# Patient Record
Sex: MALE | HISPANIC OR LATINO | Employment: FULL TIME | URBAN - METROPOLITAN AREA
[De-identification: names, ages, dates, MRNs, and addresses within clinical notes are randomized per-mention and may not be internally consistent; named-entity substitution may affect disease eponyms.]

---

## 2019-01-14 ENCOUNTER — HOSPITAL ENCOUNTER (OUTPATIENT)
Dept: URGENT CARE | Facility: CLINIC | Age: 39
Discharge: HOME | End: 2019-01-14

## 2019-04-15 ENCOUNTER — HOSPITAL ENCOUNTER (OUTPATIENT)
Dept: URGENT CARE | Facility: CLINIC | Age: 39
Discharge: HOME | End: 2019-04-15
Attending: EMERGENCY MEDICINE

## 2020-02-26 LAB
APTT BLD: 25.5 S (ref 22.2–34.2)
BASOPHILS # BLD AUTO: 0.07 10*3/UL (ref 0–0.2)
BASOPHILS NFR BLD AUTO: 1.3 % (ref 0–3)
CONV ABS IMM GRAN: 0.01 10*3/UL (ref 0–0.2)
CONV IMMATURE GRAN: 0.2 % (ref 0–1.8)
DEPRECATED RDW RBC AUTO: 39.8 FL (ref 35.1–43.9)
EOSINOPHIL # BLD AUTO: 0.12 10*3/UL (ref 0–0.7)
EOSINOPHIL # BLD AUTO: 2.2 % (ref 0–7)
ERYTHROCYTE [DISTWIDTH] IN BLOOD BY AUTOMATED COUNT: 12.8 % (ref 11.6–14.4)
HCT VFR BLD AUTO: 42.1 % (ref 42–52)
HGB BLD-MCNC: 14 G/DL (ref 14–18)
INR PPP: 0.93 (ref 2–3)
LYMPHOCYTES # BLD AUTO: 2.02 10*3/UL (ref 1–5)
LYMPHOCYTES NFR BLD AUTO: 37.6 % (ref 20–45)
MCH RBC QN AUTO: 28.6 PG (ref 27–31)
MCHC RBC AUTO-ENTMCNC: 33.3 G/DL (ref 33–37)
MCV RBC AUTO: 85.9 FL (ref 80–96)
MONOCYTES # BLD AUTO: 0.36 10*3/UL (ref 0.2–1.2)
MONOCYTES NFR BLD AUTO: 6.7 % (ref 3–10)
NEUTROPHILS # BLD AUTO: 2.79 10*3/UL (ref 2–8)
NEUTROPHILS NFR BLD AUTO: 52 % (ref 30–85)
NRBC CBCN: 0 % (ref 0–0.7)
PLATELET # BLD AUTO: 268 10*3/UL (ref 130–400)
PMV BLD AUTO: 11.4 FL (ref 9.4–12.4)
PROTHROMBIN TIME: 10.1 S (ref 9.4–12)
RBC # BLD AUTO: 4.9 10*6/UL (ref 4.7–6.1)
WBC # BLD AUTO: 5.37 10*3/UL (ref 4.8–10.8)

## 2020-02-28 ENCOUNTER — HOSPITAL ENCOUNTER (OUTPATIENT)
Dept: PERIOP | Facility: HOSPITAL | Age: 40
Setting detail: HOSPITAL OUTPATIENT SURGERY
Discharge: HOME | End: 2020-02-28
Attending: OTOLARYNGOLOGY

## 2022-01-26 ENCOUNTER — APPOINTMENT (OUTPATIENT)
Dept: GENERAL RADIOLOGY | Facility: HOSPITAL | Age: 42
End: 2022-01-26

## 2022-01-26 ENCOUNTER — HOSPITAL ENCOUNTER (EMERGENCY)
Facility: HOSPITAL | Age: 42
Discharge: HOME | End: 2022-01-26
Attending: EMERGENCY MEDICINE | Admitting: EMERGENCY MEDICINE

## 2022-01-26 VITALS
SYSTOLIC BLOOD PRESSURE: 149 MMHG | BODY MASS INDEX: 31.21 KG/M2 | OXYGEN SATURATION: 97 % | TEMPERATURE: 98.3 F | HEART RATE: 61 BPM | WEIGHT: 194.22 LBS | RESPIRATION RATE: 16 BRPM | HEIGHT: 66 IN | DIASTOLIC BLOOD PRESSURE: 103 MMHG

## 2022-01-26 DIAGNOSIS — R07.89 ATYPICAL CHEST PAIN: Primary | ICD-10-CM

## 2022-01-26 DIAGNOSIS — R09.1 PLEURISY: ICD-10-CM

## 2022-01-26 DIAGNOSIS — I10 UNCONTROLLED HYPERTENSION: ICD-10-CM

## 2022-01-26 LAB
ALBUMIN SERPL-MCNC: 5.2 G/DL (ref 3.5–5.2)
ALBUMIN/GLOB SERPL: 1.9 G/DL
ALP SERPL-CCNC: 43 U/L (ref 39–117)
ALT SERPL W P-5'-P-CCNC: 39 U/L (ref 1–41)
ANION GAP SERPL CALCULATED.3IONS-SCNC: 12.7 MMOL/L (ref 5–15)
AST SERPL-CCNC: 33 U/L (ref 1–40)
BASOPHILS # BLD AUTO: 0.04 10*3/MM3 (ref 0–0.2)
BASOPHILS NFR BLD AUTO: 0.6 % (ref 0–1.5)
BILIRUB SERPL-MCNC: 0.4 MG/DL (ref 0–1.2)
BUN SERPL-MCNC: 9 MG/DL (ref 6–20)
BUN/CREAT SERPL: 13.8 (ref 7–25)
CALCIUM SPEC-SCNC: 10.1 MG/DL (ref 8.6–10.5)
CHLORIDE SERPL-SCNC: 100 MMOL/L (ref 98–107)
CK MB SERPL-CCNC: 4.44 NG/ML
CK SERPL-CCNC: 348 U/L (ref 20–200)
CO2 SERPL-SCNC: 27.3 MMOL/L (ref 22–29)
CREAT SERPL-MCNC: 0.65 MG/DL (ref 0.76–1.27)
DEPRECATED RDW RBC AUTO: 36.3 FL (ref 37–54)
EOSINOPHIL # BLD AUTO: 0.09 10*3/MM3 (ref 0–0.4)
EOSINOPHIL NFR BLD AUTO: 1.4 % (ref 0.3–6.2)
ERYTHROCYTE [DISTWIDTH] IN BLOOD BY AUTOMATED COUNT: 12 % (ref 12.3–15.4)
GFR SERPL CREATININE-BSD FRML MDRD: 135 ML/MIN/1.73
GFR SERPL CREATININE-BSD FRML MDRD: >150 ML/MIN/1.73
GLOBULIN UR ELPH-MCNC: 2.8 GM/DL
GLUCOSE SERPL-MCNC: 103 MG/DL (ref 65–99)
HCT VFR BLD AUTO: 40.4 % (ref 37.5–51)
HGB BLD-MCNC: 14.4 G/DL (ref 13–17.7)
HOLD SPECIMEN: NORMAL
IMM GRANULOCYTES # BLD AUTO: 0.01 10*3/MM3 (ref 0–0.05)
IMM GRANULOCYTES NFR BLD AUTO: 0.2 % (ref 0–0.5)
LIPASE SERPL-CCNC: 45 U/L (ref 13–60)
LYMPHOCYTES # BLD AUTO: 2.35 10*3/MM3 (ref 0.7–3.1)
LYMPHOCYTES NFR BLD AUTO: 35.6 % (ref 19.6–45.3)
MAGNESIUM SERPL-MCNC: 2.1 MG/DL (ref 1.6–2.6)
MCH RBC QN AUTO: 29.3 PG (ref 26.6–33)
MCHC RBC AUTO-ENTMCNC: 35.6 G/DL (ref 31.5–35.7)
MCV RBC AUTO: 82.3 FL (ref 79–97)
MONOCYTES # BLD AUTO: 0.45 10*3/MM3 (ref 0.1–0.9)
MONOCYTES NFR BLD AUTO: 6.8 % (ref 5–12)
NEUTROPHILS NFR BLD AUTO: 3.66 10*3/MM3 (ref 1.7–7)
NEUTROPHILS NFR BLD AUTO: 55.4 % (ref 42.7–76)
NRBC BLD AUTO-RTO: 0 /100 WBC (ref 0–0.2)
NT-PROBNP SERPL-MCNC: 16.8 PG/ML (ref 0–450)
PLATELET # BLD AUTO: 296 10*3/MM3 (ref 140–450)
PMV BLD AUTO: 10.4 FL (ref 6–12)
POTASSIUM SERPL-SCNC: 3.7 MMOL/L (ref 3.5–5.2)
PROT SERPL-MCNC: 8 G/DL (ref 6–8.5)
RBC # BLD AUTO: 4.91 10*6/MM3 (ref 4.14–5.8)
SODIUM SERPL-SCNC: 140 MMOL/L (ref 136–145)
TROPONIN I SERPL-MCNC: 0 NG/ML (ref 0–0.6)
TROPONIN I SERPL-MCNC: 0 NG/ML (ref 0–0.6)
WBC NRBC COR # BLD: 6.6 10*3/MM3 (ref 3.4–10.8)
WHOLE BLOOD HOLD SPECIMEN: NORMAL
WHOLE BLOOD HOLD SPECIMEN: NORMAL

## 2022-01-26 PROCEDURE — 99283 EMERGENCY DEPT VISIT LOW MDM: CPT

## 2022-01-26 PROCEDURE — 96372 THER/PROPH/DIAG INJ SC/IM: CPT

## 2022-01-26 PROCEDURE — 83690 ASSAY OF LIPASE: CPT | Performed by: EMERGENCY MEDICINE

## 2022-01-26 PROCEDURE — 25010000002 KETOROLAC TROMETHAMINE PER 15 MG: Performed by: EMERGENCY MEDICINE

## 2022-01-26 PROCEDURE — 82550 ASSAY OF CK (CPK): CPT | Performed by: EMERGENCY MEDICINE

## 2022-01-26 PROCEDURE — 84484 ASSAY OF TROPONIN QUANT: CPT

## 2022-01-26 PROCEDURE — 82553 CREATINE MB FRACTION: CPT | Performed by: EMERGENCY MEDICINE

## 2022-01-26 PROCEDURE — 71045 X-RAY EXAM CHEST 1 VIEW: CPT

## 2022-01-26 PROCEDURE — 93010 ELECTROCARDIOGRAM REPORT: CPT | Performed by: INTERNAL MEDICINE

## 2022-01-26 PROCEDURE — 83880 ASSAY OF NATRIURETIC PEPTIDE: CPT | Performed by: EMERGENCY MEDICINE

## 2022-01-26 PROCEDURE — 36415 COLL VENOUS BLD VENIPUNCTURE: CPT | Performed by: EMERGENCY MEDICINE

## 2022-01-26 PROCEDURE — 85025 COMPLETE CBC W/AUTO DIFF WBC: CPT | Performed by: EMERGENCY MEDICINE

## 2022-01-26 PROCEDURE — 80053 COMPREHEN METABOLIC PANEL: CPT | Performed by: EMERGENCY MEDICINE

## 2022-01-26 PROCEDURE — 93005 ELECTROCARDIOGRAM TRACING: CPT | Performed by: EMERGENCY MEDICINE

## 2022-01-26 PROCEDURE — 83735 ASSAY OF MAGNESIUM: CPT | Performed by: EMERGENCY MEDICINE

## 2022-01-26 RX ORDER — HYDROCHLOROTHIAZIDE 25 MG/1
25 TABLET ORAL DAILY
COMMUNITY

## 2022-01-26 RX ORDER — KETOROLAC TROMETHAMINE 30 MG/ML
60 INJECTION, SOLUTION INTRAMUSCULAR; INTRAVENOUS ONCE
Status: COMPLETED | OUTPATIENT
Start: 2022-01-26 | End: 2022-01-26

## 2022-01-26 RX ORDER — LISINOPRIL 5 MG/1
5 TABLET ORAL DAILY
COMMUNITY

## 2022-01-26 RX ORDER — IBUPROFEN 600 MG/1
600 TABLET ORAL EVERY 8 HOURS PRN
Qty: 30 TABLET | Refills: 0 | Status: SHIPPED | OUTPATIENT
Start: 2022-01-26 | End: 2022-02-05

## 2022-01-26 RX ORDER — ASPIRIN 81 MG/1
324 TABLET, CHEWABLE ORAL ONCE
Status: COMPLETED | OUTPATIENT
Start: 2022-01-26 | End: 2022-01-26

## 2022-01-26 RX ORDER — SODIUM CHLORIDE 0.9 % (FLUSH) 0.9 %
10 SYRINGE (ML) INJECTION AS NEEDED
Status: DISCONTINUED | OUTPATIENT
Start: 2022-01-26 | End: 2022-01-26 | Stop reason: HOSPADM

## 2022-01-26 RX ADMIN — KETOROLAC TROMETHAMINE 60 MG: 60 INJECTION, SOLUTION INTRAMUSCULAR at 04:52

## 2022-01-26 RX ADMIN — ASPIRIN 81 MG CHEWABLE TABLET 324 MG: 81 TABLET CHEWABLE at 01:36

## 2022-01-26 NOTE — DISCHARGE INSTRUCTIONS
No strenuous activity until released by your cardiologist.  Please start on a daily baby aspirin.  Please return to the emergency room for worsening chest pain, radiating chest pain, shortness of breath, near passing out, passing out, extreme fatigue, extreme sweating, nausea or vomiting or new or worrisome symptoms    Please check your blood pressure twice a day, record and discuss those readings with your cardiologist, Dr. Natanael Nunez

## 2022-01-26 NOTE — ED PROVIDER NOTES
Time: 4:20 AM EST  Arrived by: private car  Chief Complaint: Chest pain  History provided by: Patient  History is limited by: The patient is [place]uran-speaking only.  The patient's daughter is at bedside and speaks English well.  I offered a formal .  The patient is requesting that her daughter translate.  The daughter feels comfortable for translation.      History of Present Illness:  Patient is a 41 y.o. year old male that presents to the emergency department with chest pain.  The patient has had chest pain since Thursday.  This is approximately 6 days.  The patient's chest pain is intermittent.  The patient locates the pain on the left side of the chest and radiates to the back.  There is no radiation to the jaw neck or arms.  The patient states that it is sharp in nature and worsens with deep breath.  The patient has had no nausea or diaphoresis.  Although the patient states that it hurts to breathe he denies any shortness of breath.  The patient has had no cough.  The patient has had no trauma.  The patient's had no fever, chills or myalgias.  The patient denies any abdominal pain.  The patient has had no diarrhea or hematochezia.  The patient has no unilateral calf pain or leg swelling.  The patient has hypertension.  The patient denies a history of cholesterol, diabetes or smoking.  Although he smoked over 10 years ago.  The patient does have a family history of coronary disease.  He believes it is his grandfather.  The patient has no previous history of DVT or pulmonary embolism.  The patient has had no recent travel, immobilization or surgery.  The patient does not have a history of cancer.      Similar Symptoms Previously: Yes  Recently seen: No      Patient Care Team  Primary Care Provider: Provider, No Known, the patient does see Dr. Natanael Nunez, cardiologist for hypertension    Past Medical History:     No Known Allergies  Past Medical History:   Diagnosis Date   • Hypertension      Past Surgical  "History:   Procedure Laterality Date   • THROAT SURGERY       History reviewed. No pertinent family history.    Home Medications:  Prior to Admission medications    Medication Sig Start Date End Date Taking? Authorizing Provider   hydroCHLOROthiazide (HYDRODIURIL) 25 MG tablet Take 25 mg by mouth Daily.    Provider, MD Jennifer   lisinopril (PRINIVIL,ZESTRIL) 5 MG tablet Take 5 mg by mouth Daily.    Provider, Jennifer, MD        Social History:   Social History     Tobacco Use   • Smoking status: Never Smoker   • Smokeless tobacco: Never Used   Vaping Use   • Vaping Use: Never used   Substance Use Topics   • Alcohol use: Never   • Drug use: Never          Record Review:  I have reviewed the patient's records in [facility].     Review of Systems:  Review of Systems   Constitutional: Negative for chills, diaphoresis, fatigue and fever.   HENT: Negative for congestion, postnasal drip, rhinorrhea and sore throat.    Eyes: Negative for photophobia.   Respiratory: Negative for apnea, cough, chest tightness, shortness of breath and wheezing.    Cardiovascular: Positive for chest pain. Negative for palpitations and leg swelling.   Gastrointestinal: Negative for abdominal pain, diarrhea, nausea and vomiting.   Genitourinary: Negative for difficulty urinating, dysuria, frequency, hematuria and urgency.   Musculoskeletal: Negative for back pain, gait problem and neck pain.   Skin: Negative for pallor.   Neurological: Negative for dizziness, syncope, weakness, light-headedness, numbness and headaches.   Hematological: Negative.    Psychiatric/Behavioral: Negative.            Physical Exam:  BP (!) 172/101 (BP Location: Left arm, Patient Position: Sitting)   Pulse 62   Temp 98.3 °F (36.8 °C) (Oral)   Resp 16   Ht 167.6 cm (66\")   Wt 88.1 kg (194 lb 3.6 oz)   SpO2 96%   BMI 31.35 kg/m²     Physical Exam  Vitals and nursing note reviewed.   Constitutional:       General: He is not in acute distress.     Appearance: He is " well-developed. He is not ill-appearing, toxic-appearing or diaphoretic.   HENT:      Head: Normocephalic and atraumatic.      Mouth/Throat:      Mouth: Mucous membranes are moist.   Eyes:      Extraocular Movements: Extraocular movements intact.   Neck:      Thyroid: No thyromegaly.   Cardiovascular:      Rate and Rhythm: Normal rate and regular rhythm.      Pulses: Normal pulses.           Carotid pulses are 2+ on the right side and 2+ on the left side.       Radial pulses are 2+ on the right side and 2+ on the left side.        Dorsalis pedis pulses are 2+ on the right side and 2+ on the left side.        Posterior tibial pulses are 2+ on the right side and 2+ on the left side.      Heart sounds: Normal heart sounds. No murmur heard.      Pulmonary:      Effort: Pulmonary effort is normal. No tachypnea, accessory muscle usage, respiratory distress or retractions.      Breath sounds: Normal breath sounds. No decreased breath sounds, wheezing, rhonchi or rales.      Comments: The patient's chest pain worsens with deep breath.  The patient's pain is not worse with supine position.  The patient's pain is not worsened with palpation of the anterior chest wall  Chest:      Chest wall: No mass, tenderness, crepitus or edema.   Abdominal:      General: Abdomen is flat. There is no distension or abdominal bruit.      Palpations: Abdomen is soft. There is no mass.      Tenderness: There is no abdominal tenderness. There is no guarding or rebound.      Comments: No rigidity   Musculoskeletal:         General: Normal range of motion.      Cervical back: Normal range of motion and neck supple.      Right lower leg: No tenderness. No edema.      Left lower leg: No tenderness. No edema.   Skin:     General: Skin is warm and dry.      Capillary Refill: Capillary refill takes less than 2 seconds.      Coloration: Skin is not cyanotic or pale.      Findings: No ecchymosis.   Neurological:      General: No focal deficit present.       Mental Status: He is alert. Mental status is at baseline.      Motor: No weakness.   Psychiatric:         Mood and Affect: Mood normal.         Behavior: Behavior normal.                Medications in the Emergency Department:  Medications   sodium chloride 0.9 % flush 10 mL (has no administration in time range)   aspirin chewable tablet 324 mg (324 mg Oral Given 1/26/22 0136)   ketorolac (TORADOL) injection 60 mg (60 mg Intramuscular Given 1/26/22 0452)        Labs  Lab Results (last 24 hours)     Procedure Component Value Units Date/Time    POC Troponin I with Hold Tube [[id number]] Collected: 01/26/22 0108    Specimen: Blood Updated: 01/26/22 0231    Narrative:      The following orders were created for panel order POC Troponin I with Hold Tube.  Procedure                               Abnormality         Status                     ---------                               -----------         ------                     POC Troponin I[[id number]]                                                              HOLD Troponin-I Tube[[id number]]                             Final result                 Please view results for these tests on the individual orders.    CBC & Differential [[id number]]  (Abnormal) Collected: 01/26/22 0108    Specimen: Blood Updated: 01/26/22 0133    Narrative:      The following orders were created for panel order CBC & Differential.  Procedure                               Abnormality         Status                     ---------                               -----------         ------                     CBC Auto Differential[[id number]]        Abnormal            Final result                 Please view results for these tests on the individual orders.    Comprehensive Metabolic Panel [[id number]]  (Abnormal) Collected: 01/26/22 0108    Specimen: Blood Updated: 01/26/22 0145     Glucose 103 mg/dL      BUN 9 mg/dL      Creatinine 0.65 mg/dL      Sodium 140 mmol/L      Potassium 3.7 mmol/L       Chloride 100 mmol/L      CO2 27.3 mmol/L      Calcium 10.1 mg/dL      Total Protein 8.0 g/dL      Albumin 5.20 g/dL      ALT (SGPT) 39 U/L      AST (SGOT) 33 U/L      Alkaline Phosphatase 43 U/L      Total Bilirubin 0.4 mg/dL      eGFR Non African Amer 135 mL/min/1.73      eGFR  African Amer >150 mL/min/1.73      Globulin 2.8 gm/dL      A/G Ratio 1.9 g/dL      BUN/Creatinine Ratio 13.8     Anion Gap 12.7 mmol/L     Narrative:      GFR Normal >60  Chronic Kidney Disease <60  Kidney Failure <15      Lipase [[id number]]  (Normal) Collected: 01/26/22 0108    Specimen: Blood Updated: 01/26/22 0145     Lipase 45 U/L     BNP [[id number]]  (Normal) Collected: 01/26/22 0108    Specimen: Blood Updated: 01/26/22 0143     proBNP 16.8 pg/mL     Narrative:      Among patients with dyspnea, NT-proBNP is highly sensitive for the detection of acute congestive heart failure. In addition NT-proBNP of <300 pg/ml effectively rules out acute congestive heart failure with 99% negative predictive value.    Results may be falsely decreased if patient taking Biotin.      Magnesium [[id number]]  (Normal) Collected: 01/26/22 0108    Specimen: Blood Updated: 01/26/22 0145     Magnesium 2.1 mg/dL     CK Total & CKMB [[id number]]  (Abnormal) Collected: 01/26/22 0108    Specimen: Blood Updated: 01/26/22 0145     CKMB 4.44 ng/mL      Creatine Kinase 348 U/L     Narrative:      CKMB results may be falsely decreased if patient taking Biotin.    CBC Auto Differential [[id number]]  (Abnormal) Collected: 01/26/22 0108    Specimen: Blood Updated: 01/26/22 0133     WBC 6.60 10*3/mm3      RBC 4.91 10*6/mm3      Hemoglobin 14.4 g/dL      Hematocrit 40.4 %      MCV 82.3 fL      MCH 29.3 pg      MCHC 35.6 g/dL      RDW 12.0 %      RDW-SD 36.3 fl      MPV 10.4 fL      Platelets 296 10*3/mm3      Neutrophil % 55.4 %      Lymphocyte % 35.6 %      Monocyte % 6.8 %      Eosinophil % 1.4 %      Basophil % 0.6 %      Immature Grans % 0.2 %      Neutrophils, Absolute 3.66  10*3/mm3      Lymphocytes, Absolute 2.35 10*3/mm3      Monocytes, Absolute 0.45 10*3/mm3      Eosinophils, Absolute 0.09 10*3/mm3      Basophils, Absolute 0.04 10*3/mm3      Immature Grans, Absolute 0.01 10*3/mm3      nRBC 0.0 /100 WBC     POC Troponin I [[id number]]  (Normal) Collected: 01/26/22 0117    Specimen: Blood Updated: 01/26/22 0129     Troponin I 0.00 ng/mL      Comment: Serial Number: [id number]:  [id number]       POC Troponin I [[id number]]  (Normal) Collected: 01/26/22 0341    Specimen: Blood Updated: 01/26/22 0353     Troponin I 0.00 ng/mL      Comment: Serial Number: [id number]:  [id number]              Imaging:  XR Chest 1 View   Final Result     No acute infiltrate is appreciated.                       ANDERSON NEWTON JR, MD          Electronically Signed and Approved By: ANDERSON NEWTON JR, MD on 1/26/2022 at 2:30                               Procedures:  Procedures    Progress  ED Course as of 01/26/22 0517   Wed Jan 26, 2022   0115 EKG:    Rhythm: Normal sinus rhythm  Rate: 72  Intervals: Normal GA and QT interval  T-wave: T wave inversion III and nonspecific T wave flattening in V6, II, aVF  ST Segment: Nonspecific ST segments V1, V2, V3, no reciprocal changes, no obvious pathological ST elevation or ST depression    EKG Comparison: The EKG is unchanged in QRS and ST morphology from the EKG performed February 26, 2020    Interpreted by me   [SD]   0419 EKG:    Rhythm: Normal sinus rhythm  Rate: 63  Intervals: Normal GA and QT interval  T-wave: T wave inversion in III, nonspecific T wave flattening in V6, remainder of 2, aVF  ST Segment: Nonspecific ST segments in V1, V2, V3, no reciprocal changes, no obvious pathological ST elevation or ST depression    EKG Comparison: EKG is unchanged from the EKG performed previously in the department    Interpreted by me   [SD]      ED Course User Index  [SD] Moy Millan DO                            Medical Decision Making:  MDM  Number of Diagnoses or  Management Options  Diagnosis management comments:     PERC Rule for Pulmonary Embolism - MDCalc  Calculated on Jan 26 2022 4:25 AM  0 criteria -> No need for further workup, as <2% chance of PE. If no criteria are positive and clinician's pre-test probability is <15%, PERC Rule criteria are satisfied.    The patient's PERC score was negative.  I have discussed with the patient that they have a very low risk for pulmonary embolism.  I have discussed possibly performing a CAT scan of the chest with IV contrast to rule out pulmonary embolism.  However, due to the fact that the patient is very low risk for pulmonary embolism, they would prefer not to have a CAT scan of the chest at this time due to radiation exposure.    HEART Score for Major Cardiac Events - MDCalc  Calculated on Jan 26 2022 4:26 AM  2 points -> Low Score (0-3 points) Risk of MACE of 0.9-1.7%.    The patient was given Toradol and reevaluated.  The patient states his pain is much better after the Toradol.    The patient had 2 troponins that were within normal limits.  The patient had 2 EKGs that demonstrated no evidence of ST segment elevation MI or other injury pattern    The patient appears well, in no distress and nontoxic.  The patient is resting comfortably.  The patient has a low heart score.  I have discussed the significance of the heart score with them.  The patient understands that they have a low risk for cardiovascular event over the next 6 weeks.  Understanding the risks, they feel comfortable to be discharged home and to follow-up with the cardiologist on an outpatient basis for stress test.   In the interim, the patient does understand should they develop worsening chest pain, near syncope, syncope, extreme fatigue, worsening shortness of breath or diaphoresis to return back to emergency room immediately.         Amount and/or Complexity of Data Reviewed  Clinical lab tests: reviewed  Tests in the radiology section of CPT®:  reviewed  Tests in the medicine section of CPT®: reviewed               Final diagnoses:   Atypical chest pain   Pleurisy        Disposition:  ED Disposition     ED Disposition Condition Comment    Discharge Stable           This medical record created using voice recognition software and may contain unintended errors.    DO Monty Escobedo Scott, DO  01/30/22 8816

## 2022-01-29 LAB
QT INTERVAL: 382 MS
QT INTERVAL: 408 MS

## 2022-03-29 ENCOUNTER — TRANSCRIBE ORDERS (OUTPATIENT)
Dept: GENERAL RADIOLOGY | Facility: HOSPITAL | Age: 42
End: 2022-03-29

## 2022-03-29 ENCOUNTER — HOSPITAL ENCOUNTER (OUTPATIENT)
Dept: GENERAL RADIOLOGY | Facility: HOSPITAL | Age: 42
Discharge: HOME | End: 2022-03-29
Admitting: CHIROPRACTOR

## 2022-03-29 DIAGNOSIS — R60.9 SWELLING: Primary | ICD-10-CM

## 2022-03-29 DIAGNOSIS — R60.9 SWELLING: ICD-10-CM

## 2022-03-29 PROCEDURE — 73630 X-RAY EXAM OF FOOT: CPT

## 2022-12-06 ENCOUNTER — APPOINTMENT (OUTPATIENT)
Dept: CT IMAGING | Facility: HOSPITAL | Age: 42
End: 2022-12-06

## 2022-12-06 ENCOUNTER — HOSPITAL ENCOUNTER (EMERGENCY)
Facility: HOSPITAL | Age: 42
Discharge: HOME | End: 2022-12-06
Attending: EMERGENCY MEDICINE | Admitting: EMERGENCY MEDICINE

## 2022-12-06 VITALS
OXYGEN SATURATION: 97 % | TEMPERATURE: 98 F | SYSTOLIC BLOOD PRESSURE: 170 MMHG | BODY MASS INDEX: 32.35 KG/M2 | HEART RATE: 73 BPM | DIASTOLIC BLOOD PRESSURE: 112 MMHG | WEIGHT: 200.4 LBS | RESPIRATION RATE: 18 BRPM

## 2022-12-06 DIAGNOSIS — S02.2XXA CLOSED FRACTURE OF NASAL BONE, INITIAL ENCOUNTER: Primary | ICD-10-CM

## 2022-12-06 LAB
ALBUMIN SERPL-MCNC: 5.2 G/DL (ref 3.5–5.2)
ALBUMIN/GLOB SERPL: 1.7 G/DL
ALP SERPL-CCNC: 62 U/L (ref 39–117)
ALT SERPL W P-5'-P-CCNC: 33 U/L (ref 1–41)
ANION GAP SERPL CALCULATED.3IONS-SCNC: 15.2 MMOL/L (ref 5–15)
AST SERPL-CCNC: 30 U/L (ref 1–40)
BASOPHILS # BLD AUTO: 0.07 10*3/MM3 (ref 0–0.2)
BASOPHILS NFR BLD AUTO: 1.1 % (ref 0–1.5)
BILIRUB SERPL-MCNC: 0.2 MG/DL (ref 0–1.2)
BUN SERPL-MCNC: 8 MG/DL (ref 6–20)
BUN/CREAT SERPL: 13.1 (ref 7–25)
CALCIUM SPEC-SCNC: 9.5 MG/DL (ref 8.6–10.5)
CHLORIDE SERPL-SCNC: 97 MMOL/L (ref 98–107)
CO2 SERPL-SCNC: 26.8 MMOL/L (ref 22–29)
CREAT SERPL-MCNC: 0.61 MG/DL (ref 0.76–1.27)
DEPRECATED RDW RBC AUTO: 37 FL (ref 37–54)
EGFRCR SERPLBLD CKD-EPI 2021: 123.8 ML/MIN/1.73
EOSINOPHIL # BLD AUTO: 0.16 10*3/MM3 (ref 0–0.4)
EOSINOPHIL NFR BLD AUTO: 2.5 % (ref 0.3–6.2)
ERYTHROCYTE [DISTWIDTH] IN BLOOD BY AUTOMATED COUNT: 12.2 % (ref 12.3–15.4)
ETHANOL BLD-MCNC: 259 MG/DL (ref 0–10)
ETHANOL UR QL: 0.26 %
GLOBULIN UR ELPH-MCNC: 3 GM/DL
GLUCOSE SERPL-MCNC: 108 MG/DL (ref 65–99)
HCT VFR BLD AUTO: 41.7 % (ref 37.5–51)
HGB BLD-MCNC: 14.8 G/DL (ref 13–17.7)
IMM GRANULOCYTES # BLD AUTO: 0.01 10*3/MM3 (ref 0–0.05)
IMM GRANULOCYTES NFR BLD AUTO: 0.2 % (ref 0–0.5)
LYMPHOCYTES # BLD AUTO: 3.05 10*3/MM3 (ref 0.7–3.1)
LYMPHOCYTES NFR BLD AUTO: 48.4 % (ref 19.6–45.3)
MCH RBC QN AUTO: 29.5 PG (ref 26.6–33)
MCHC RBC AUTO-ENTMCNC: 35.5 G/DL (ref 31.5–35.7)
MCV RBC AUTO: 83.1 FL (ref 79–97)
MONOCYTES # BLD AUTO: 0.43 10*3/MM3 (ref 0.1–0.9)
MONOCYTES NFR BLD AUTO: 6.8 % (ref 5–12)
NEUTROPHILS NFR BLD AUTO: 2.58 10*3/MM3 (ref 1.7–7)
NEUTROPHILS NFR BLD AUTO: 41 % (ref 42.7–76)
NRBC BLD AUTO-RTO: 0 /100 WBC (ref 0–0.2)
PLATELET # BLD AUTO: 247 10*3/MM3 (ref 140–450)
PMV BLD AUTO: 10.5 FL (ref 6–12)
POTASSIUM SERPL-SCNC: 3.5 MMOL/L (ref 3.5–5.2)
PROT SERPL-MCNC: 8.2 G/DL (ref 6–8.5)
RBC # BLD AUTO: 5.02 10*6/MM3 (ref 4.14–5.8)
SODIUM SERPL-SCNC: 139 MMOL/L (ref 136–145)
WBC NRBC COR # BLD: 6.3 10*3/MM3 (ref 3.4–10.8)

## 2022-12-06 PROCEDURE — 25010000002 METOCLOPRAMIDE PER 10 MG: Performed by: EMERGENCY MEDICINE

## 2022-12-06 PROCEDURE — 96374 THER/PROPH/DIAG INJ IV PUSH: CPT

## 2022-12-06 PROCEDURE — 85025 COMPLETE CBC W/AUTO DIFF WBC: CPT | Performed by: EMERGENCY MEDICINE

## 2022-12-06 PROCEDURE — 82077 ASSAY SPEC XCP UR&BREATH IA: CPT | Performed by: PHYSICIAN ASSISTANT

## 2022-12-06 PROCEDURE — 96375 TX/PRO/DX INJ NEW DRUG ADDON: CPT

## 2022-12-06 PROCEDURE — 90715 TDAP VACCINE 7 YRS/> IM: CPT | Performed by: PHYSICIAN ASSISTANT

## 2022-12-06 PROCEDURE — 25010000002 DIPHENHYDRAMINE PER 50 MG: Performed by: EMERGENCY MEDICINE

## 2022-12-06 PROCEDURE — 99282 EMERGENCY DEPT VISIT SF MDM: CPT

## 2022-12-06 PROCEDURE — 90471 IMMUNIZATION ADMIN: CPT | Performed by: PHYSICIAN ASSISTANT

## 2022-12-06 PROCEDURE — 25010000002 KETOROLAC TROMETHAMINE PER 15 MG: Performed by: EMERGENCY MEDICINE

## 2022-12-06 PROCEDURE — 70486 CT MAXILLOFACIAL W/O DYE: CPT

## 2022-12-06 PROCEDURE — 70450 CT HEAD/BRAIN W/O DYE: CPT

## 2022-12-06 PROCEDURE — 80053 COMPREHEN METABOLIC PANEL: CPT | Performed by: EMERGENCY MEDICINE

## 2022-12-06 PROCEDURE — 25010000002 TETANUS-DIPHTH-ACELL PERTUSSIS 5-2.5-18.5 LF-MCG/0.5 SUSPENSION PREFILLED SYRINGE: Performed by: PHYSICIAN ASSISTANT

## 2022-12-06 RX ORDER — DIPHENHYDRAMINE HYDROCHLORIDE 50 MG/ML
12.5 INJECTION INTRAMUSCULAR; INTRAVENOUS ONCE
Status: COMPLETED | OUTPATIENT
Start: 2022-12-06 | End: 2022-12-06

## 2022-12-06 RX ORDER — ACETAMINOPHEN 500 MG
1000 TABLET ORAL ONCE
Status: COMPLETED | OUTPATIENT
Start: 2022-12-06 | End: 2022-12-06

## 2022-12-06 RX ORDER — KETOROLAC TROMETHAMINE 30 MG/ML
30 INJECTION, SOLUTION INTRAMUSCULAR; INTRAVENOUS ONCE
Status: COMPLETED | OUTPATIENT
Start: 2022-12-06 | End: 2022-12-06

## 2022-12-06 RX ORDER — METOCLOPRAMIDE HYDROCHLORIDE 5 MG/ML
10 INJECTION INTRAMUSCULAR; INTRAVENOUS ONCE
Status: COMPLETED | OUTPATIENT
Start: 2022-12-06 | End: 2022-12-06

## 2022-12-06 RX ADMIN — METOCLOPRAMIDE HYDROCHLORIDE 10 MG: 5 INJECTION INTRAMUSCULAR; INTRAVENOUS at 07:05

## 2022-12-06 RX ADMIN — TETANUS TOXOID, REDUCED DIPHTHERIA TOXOID AND ACELLULAR PERTUSSIS VACCINE, ADSORBED 0.5 ML: 5; 2.5; 8; 8; 2.5 SUSPENSION INTRAMUSCULAR at 06:53

## 2022-12-06 RX ADMIN — ACETAMINOPHEN 1000 MG: 500 TABLET ORAL at 07:09

## 2022-12-06 RX ADMIN — KETOROLAC TROMETHAMINE 30 MG: 30 INJECTION, SOLUTION INTRAMUSCULAR; INTRAVENOUS at 07:04

## 2022-12-06 RX ADMIN — SODIUM CHLORIDE 1000 ML: 9 INJECTION, SOLUTION INTRAVENOUS at 06:59

## 2022-12-06 RX ADMIN — DIPHENHYDRAMINE HYDROCHLORIDE 12.5 MG: 50 INJECTION, SOLUTION INTRAMUSCULAR; INTRAVENOUS at 07:01

## 2022-12-06 NOTE — ED PROVIDER NOTES
Time: 1:08 AM EST  Chief Complaint:   Chief Complaint   Patient presents with   • Fall   • Facial Injury           History of Present Illness:          Patient is a 41 y.o. year old male who presents to the emergency department with facial injury. Pt has been drinking beer today. Was walking and tripped hitting face. Sustained laceration to left cheek and upper eye. Denies syncope. Unsure last tetanus. Was having pain to face and headache and some dizziness. Denies vision changes or chest pain.     Pt reports falling down and having syncope and hurting his eye. Pt reports that it hurts more when he closes it. Pt reports a headache. Pt reports drinking. Pt denies neck and back pain.           History provided by:  Patient   used: Yes    Fall  Mechanism of injury: fall    Injury location:  Head/neck and face  Arrived directly from scene: yes    Fall:     Point of impact:  Head and face  Associated symptoms: headaches    Associated symptoms: no abdominal pain, no chest pain, no nausea, no seizures and no vomiting    Facial Injury  Associated symptoms: headaches    Associated symptoms: no congestion, no nausea, no rhinorrhea and no vomiting            Patient Care Team  Primary Care Provider: ProviderElina Known    Past Medical History:     No Known Allergies  Past Medical History:   Diagnosis Date   • Hypertension      Past Surgical History:   Procedure Laterality Date   • THROAT SURGERY       No family history on file.    Home Medications:  Prior to Admission medications    Medication Sig Start Date End Date Taking? Authorizing Provider   azelastine (ASTELIN) 0.1 % nasal spray 2 sprays into the nostril(s) as directed by provider 2 (Two) Times a Day. Use in each nostril as directed 9/6/22   Elizabeth Salazar APRN   hydroCHLOROthiazide (HYDRODIURIL) 25 MG tablet Take 25 mg by mouth Daily.    Provider, MD Jennifer   lisinopril (PRINIVIL,ZESTRIL) 5 MG tablet Take 5 mg by mouth Daily.     Provider, Historical, MD        Social History:   Social History     Tobacco Use   • Smoking status: Never   • Smokeless tobacco: Never   Vaping Use   • Vaping Use: Never used   Substance Use Topics   • Alcohol use: Never   • Drug use: Never         Review of Systems:  Review of Systems   Constitutional: Negative for chills and fever.   HENT: Negative for congestion, rhinorrhea and sore throat.    Eyes: Positive for pain. Negative for visual disturbance.   Respiratory: Negative for apnea, cough, chest tightness and shortness of breath.    Cardiovascular: Negative for chest pain and palpitations.   Gastrointestinal: Negative for abdominal pain, diarrhea, nausea and vomiting.   Genitourinary: Negative for difficulty urinating and dysuria.   Musculoskeletal: Negative for joint swelling and myalgias.   Skin: Positive for wound. Negative for color change.   Neurological: Positive for headaches. Negative for seizures and syncope.   Psychiatric/Behavioral: Negative.    All other systems reviewed and are negative.       Physical Exam:  BP (!) 170/112 (BP Location: Left arm, Patient Position: Sitting)   Pulse 73   Temp 98 °F (36.7 °C) (Oral)   Resp 18   Wt 90.9 kg (200 lb 6.4 oz)   SpO2 97%   BMI 32.35 kg/m²     Physical Exam  Vitals and nursing note reviewed.   Constitutional:       General: He is not in acute distress.     Appearance: Normal appearance. He is not toxic-appearing.      Comments: Pt intoxicated   HENT:      Head: Normocephalic and atraumatic.      Jaw: There is normal jaw occlusion.      Comments: Laceration left face 6-7 cm  Eyes:      General: Lids are normal.      Extraocular Movements: Extraocular movements intact.      Conjunctiva/sclera: Conjunctivae normal.      Pupils: Pupils are equal, round, and reactive to light.   Cardiovascular:      Rate and Rhythm: Normal rate and regular rhythm.      Pulses: Normal pulses.      Heart sounds: Normal heart sounds.   Pulmonary:      Effort: Pulmonary  effort is normal. No respiratory distress.      Breath sounds: Normal breath sounds. No wheezing or rhonchi.   Abdominal:      General: Abdomen is flat.      Palpations: Abdomen is soft.      Tenderness: There is no abdominal tenderness. There is no guarding or rebound.   Musculoskeletal:         General: Normal range of motion.      Cervical back: Normal range of motion and neck supple.      Right lower leg: No edema.      Left lower leg: No edema.   Skin:     General: Skin is warm and dry.      Comments: Superficial laceration to left cheek and above eyelid   Neurological:      Mental Status: He is alert and oriented to person, place, and time. Mental status is at baseline.   Psychiatric:         Mood and Affect: Mood normal.                    Medications in the Emergency Department:  Medications   Tetanus-Diphth-Acell Pertussis (BOOSTRIX) injection 0.5 mL (0.5 mL Intramuscular Given 12/6/22 0653)   ketorolac (TORADOL) injection 30 mg (30 mg Intravenous Given 12/6/22 0704)   acetaminophen (TYLENOL) tablet 1,000 mg (1,000 mg Oral Given 12/6/22 0709)   sodium chloride 0.9 % bolus 1,000 mL (1,000 mL Intravenous New Bag 12/6/22 0659)   metoclopramide (REGLAN) injection 10 mg (10 mg Intravenous Given 12/6/22 0705)   diphenhydrAMINE (BENADRYL) injection 12.5 mg (12.5 mg Intravenous Given 12/6/22 0701)        Labs  Lab Results (last 24 hours)     Procedure Component Value Units Date/Time    CBC & Differential [[id number]]  (Abnormal) Collected: 12/06/22 0116    Specimen: Blood Updated: 12/06/22 0133    Narrative:      The following orders were created for panel order CBC & Differential.  Procedure                               Abnormality         Status                     ---------                               -----------         ------                     CBC Auto Differential[[id number]]        Abnormal            Final result                 Please view results for these tests on the individual orders.     Comprehensive Metabolic Panel [[id number]]  (Abnormal) Collected: 12/06/22 0116    Specimen: Blood Updated: 12/06/22 0142     Glucose 108 mg/dL      BUN 8 mg/dL      Creatinine 0.61 mg/dL      Sodium 139 mmol/L      Potassium 3.5 mmol/L      Chloride 97 mmol/L      CO2 26.8 mmol/L      Calcium 9.5 mg/dL      Total Protein 8.2 g/dL      Albumin 5.20 g/dL      ALT (SGPT) 33 U/L      AST (SGOT) 30 U/L      Alkaline Phosphatase 62 U/L      Total Bilirubin 0.2 mg/dL      Globulin 3.0 gm/dL      A/G Ratio 1.7 g/dL      BUN/Creatinine Ratio 13.1     Anion Gap 15.2 mmol/L      eGFR 123.8 mL/min/1.73      Comment: National Kidney Foundation and American Society of Nephrology (ASN) Task Force recommended calculation based on the Chronic Kidney Disease Epidemiology Collaboration (CKD-EPI) equation refit without adjustment for race.       Narrative:      GFR Normal >60  Chronic Kidney Disease <60  Kidney Failure <15      CBC Auto Differential [[id number]]  (Abnormal) Collected: 12/06/22 0116    Specimen: Blood Updated: 12/06/22 0133     WBC 6.30 10*3/mm3      RBC 5.02 10*6/mm3      Hemoglobin 14.8 g/dL      Hematocrit 41.7 %      MCV 83.1 fL      MCH 29.5 pg      MCHC 35.5 g/dL      RDW 12.2 %      RDW-SD 37.0 fl      MPV 10.5 fL      Platelets 247 10*3/mm3      Neutrophil % 41.0 %      Lymphocyte % 48.4 %      Monocyte % 6.8 %      Eosinophil % 2.5 %      Basophil % 1.1 %      Immature Grans % 0.2 %      Neutrophils, Absolute 2.58 10*3/mm3      Lymphocytes, Absolute 3.05 10*3/mm3      Monocytes, Absolute 0.43 10*3/mm3      Eosinophils, Absolute 0.16 10*3/mm3      Basophils, Absolute 0.07 10*3/mm3      Immature Grans, Absolute 0.01 10*3/mm3      nRBC 0.0 /100 WBC     Ethanol [[id number]]  (Abnormal) Collected: 12/06/22 0116    Specimen: Blood Updated: 12/06/22 0142     Ethanol 259 mg/dL      Ethanol % 0.259 %     Narrative:      Ethanol (Plasma)  <10 Essentially Negative    Toxic Concentrations           mg/dL    Flushing, slowing  of reflexes    Impaired visual activity         Depression of CNS              >100  Possible Coma                  >300              Imaging:  CT Head Without Contrast    Result Date: 12/6/2022  PROCEDURE: CT HEAD WO CONTRAST  COMPARISON: None.  INDICATIONS: FALL.  PROTOCOL:   Standard CT imaging protocol performed.    RADIATION:   Total DLP: 1,016 mGy*cm   MA and/or KV were/was adjusted to minimize radiation dose.    TECHNIQUE: After obtaining the patient's consent, 131 CT images were obtained without non-ionic intravenous contrast material.  DISCUSSION:   A routine nonenhanced head CT was performed.  No acute brain abnormality is identified.  No acute intracranial hemorrhage.  No acute infarct is seen.  No acute skull fracture.  No midline shift or acute intracranial mass effect is seen.  The ventricular size and configuration are within normal limits.  Mild acute contusions involve the left pre-maxillary and left periorbital regions as well as the nasal dorsum.  Acute comminuted slightly displaced bilateral nasal bone fractures are seen, as on image 16 of series 202 and adjacent images.  Acute nondisplaced fractures involving the anterior nasal septum cannot be excluded.  The imaged middle ear clefts (that is, the bilateral mastoid air cell complexes, bilateral middle ear cavities, and bilateral external auditory canals) are well aerated.  There is age-indeterminate, but probably chronic, sinus disease, especially involving the inferior maxillary paranasal sinuses.       No acute brain abnormality is seen. Specifically, no acute intracranial hemorrhage, no acute infarct, no intracranial mass lesion, and no acute intracranial mass effect are appreciated.  Acute nasal bone fractures are seen bilaterally.    Please note that portions of this note were completed with a voice recognition program.  ANDERSON NEWTON JR, MD       Electronically Signed and Approved By: ANDERSON NEWTON JR, MD on 12/06/2022 at  5:03              CT Facial Bones Without Contrast    Result Date: 12/6/2022  PROCEDURE: CT FACIAL BONES WO CONTRAST  COMPARISON: None.  INDICATIONS: FALL.  MAXILLOFACIAL TRAUMA/INJURY.  PROTOCOL:   Standard CT imaging protocol performed.    RADIATION:   Total DLP: 598.8 mGy*cm   Automated exposure control was utilized to minimize radiation dose.  TECHNIQUE: After obtaining the patient's consent, 558 CT images were created without non-ionic intravenous contrast.  Two-dimensional sagittal and coronal reformations are provided for review.  FINDINGS:  There are acute comminuted slightly displaced bilateral nasal bone fractures.  Acute comminuted nondisplaced or slightly displaced anterior nasal septal fractures may be present, as well.  There is suspected chronic leftward nasal septal deviation also (estimated at about 5 mm).  No other definite acute fractures are seen.  Mild-to-moderate age-indeterminate, but probably chronic, mucosal thickening and/or retained secretions involve(s) the imaged paranasal sinuses, especially the inferior maxillary paranasal sinuses.  No air-fluid interfaces are seen within the imaged paranasal sinuses.  An odontogenic origin of maxillary sinus disease cannot be excluded.  No definite oral antral fistulas are identified, however.  Acute soft tissue contusions involve the left pre-maxillary and left periorbital regions.  There is also acute soft tissue contusion involving the nasal septum and probably the upper lip.  No definite subcutaneous emphysema.  No unintended retained foreign body is suggested.  The imaged airway is patent.  There are nonspecific small-to-moderate-sized bilateral cervical lymph nodes.  No other acute orbital findings are seen.        Acute slightly displaced bilateral nasal bone fractures are seen.  There may be acute nondisplaced or minimally displaced anterior nasal septal fractures, as well.  No other definite acute fractures are seen.  Please see above  comments for further detail.     Please note that portions of this note were completed with a voice recognition program.  ANDERSON NEWTON JR, MD       Electronically Signed and Approved By: ANDERSON NEWTON JR, MD on 12/06/2022 at 5:10                Procedures:  Procedures    Progress                            The patient was initially evaluated in the triage area where orders were placed. The patient was later dispositioned by Bianca Shaffer MD.      The patient was advised to stay for completion of workup which includes but is not limited to communication of labs and radiological results, reassessment and plan. The patient was advised that leaving prior to disposition by a provider could result in critical findings that are not communicated to the patient.     Medical Decision Making:  MDM  Number of Diagnoses or Management Options  Closed fracture of nasal bone, initial encounter  Diagnosis management comments: The patient presents with an acute closed head injury. Jber Criteria for CT scan was followed. CT of the head is required for patients with minor head injury and any one of the following (including a GCS of 15):     1.                     Headache   2.                     Vomiting   3.                     Older than 60 years   4.                     Drug or Alcohol intoxication   5.                     Persistent anterograde amnesia   6.                     Visible trauma above the clavicle   7.                     Seizure.      The CT scan of the head shows no acute intracranial abnormalities. This includes subarachnoid hemorrhage, subdural hematoma, epidural hematoma, or calvarial fractures.  CT maxillofacial is consistent with nasal bone fractures.  The patient has no epistaxis in the emergency department.  Patient was monitored in the ED for approximately 9 hours with no seizure-like activity.  The patient´s CBC that was reviewed and interpreted by me shows no abnormalities of critical  concern. Of note, there is no anemia requiring a blood transfusion and the platelet count is acceptable.  The patient´s CMP that was reviewed and interpretted by me shows no abnormalities of critical concern. Of note, the patient´s sodium and potassium are acceptable. The patient´s liver enzymes are unremarkable. The patient´s renal function (creatinine) is preserved. The patient has a normal anion gap.  Ethanol level is 259.  Patient was given 1 L normal saline bolus.  The patient is neurologically intact, has a normal mental status and is ambulatory in the ED. The patient has had no seizure like activity in the ED. The vital signs have been stable. The patient's condition is stable and appropriate for discharge. The patient made aware of the symptoms of post-concussive syndrome. The patient was counseled to return to the ED for motor or sensory deficit, altered mental status, uncontrollable headache, visual changes, seizures, or for any re-examination of new symptoms. The patient will pursue further outpatient evaluation with the primary care physician or other designated or consulting position as indicated in the discharge instructions as a follow up.          Amount and/or Complexity of Data Reviewed  Clinical lab tests: reviewed  Tests in the radiology section of CPT®: reviewed  Independent visualization of images, tracings, or specimens: yes    Risk of Complications, Morbidity, and/or Mortality  Presenting problems: moderate  Management options: moderate    Patient Progress  Patient progress: stable           The following orders were placed after triage and evaluation:  Orders Placed This Encounter   Procedures   • CT Head Without Contrast   • CT Facial Bones Without Contrast   • Comprehensive Metabolic Panel   • CBC Auto Differential   • Ethanol   • CBC & Differential       Final diagnoses:   Closed fracture of nasal bone, initial encounter          Disposition:  ED Disposition     ED Disposition   Discharge     Condition   Stable    Comment   --             This medical record created using voice recognition software.    Documentation assistance provided by Jacob Rucker, acting as scribe for Bianca Shaffer MD. Information recorded by the scribe was done at my direction and has been verified and validated by me.         Jacob Rucker  12/06/22 0703       Bianca Shaffer MD  12/06/22 0948

## 2023-07-31 ENCOUNTER — APPOINTMENT (OUTPATIENT)
Dept: CT IMAGING | Facility: HOSPITAL | Age: 43
End: 2023-07-31

## 2023-07-31 ENCOUNTER — APPOINTMENT (OUTPATIENT)
Dept: MRI IMAGING | Facility: HOSPITAL | Age: 43
End: 2023-07-31

## 2023-07-31 ENCOUNTER — APPOINTMENT (OUTPATIENT)
Dept: GENERAL RADIOLOGY | Facility: HOSPITAL | Age: 43
End: 2023-07-31

## 2023-07-31 ENCOUNTER — HOSPITAL ENCOUNTER (OUTPATIENT)
Facility: HOSPITAL | Age: 43
Setting detail: OBSERVATION
Discharge: HOME | End: 2023-08-01
Attending: EMERGENCY MEDICINE | Admitting: INTERNAL MEDICINE

## 2023-07-31 DIAGNOSIS — E87.6 HYPOKALEMIA: ICD-10-CM

## 2023-07-31 DIAGNOSIS — R20.0 LEFT ARM NUMBNESS: ICD-10-CM

## 2023-07-31 DIAGNOSIS — I16.0 HYPERTENSIVE URGENCY: Primary | ICD-10-CM

## 2023-07-31 PROBLEM — I16.9 HYPERTENSIVE CRISIS: Status: ACTIVE | Noted: 2023-07-31

## 2023-07-31 LAB
ABO GROUP BLD: NORMAL
ABO GROUP BLD: NORMAL
ALBUMIN SERPL-MCNC: 4.9 G/DL (ref 3.5–5.2)
ALBUMIN/GLOB SERPL: 1.8 G/DL
ALP SERPL-CCNC: 56 U/L (ref 39–117)
ALT SERPL W P-5'-P-CCNC: 38 U/L (ref 1–41)
ANION GAP SERPL CALCULATED.3IONS-SCNC: 14.9 MMOL/L (ref 5–15)
AST SERPL-CCNC: 30 U/L (ref 1–40)
BASOPHILS # BLD AUTO: 0.08 10*3/MM3 (ref 0–0.2)
BASOPHILS NFR BLD AUTO: 1.3 % (ref 0–1.5)
BILIRUB SERPL-MCNC: 0.2 MG/DL (ref 0–1.2)
BLD GP AB SCN SERPL QL: NEGATIVE
BUN SERPL-MCNC: 10 MG/DL (ref 6–20)
BUN/CREAT SERPL: 14.5 (ref 7–25)
CALCIUM SPEC-SCNC: 9.3 MG/DL (ref 8.6–10.5)
CHLORIDE SERPL-SCNC: 98 MMOL/L (ref 98–107)
CO2 SERPL-SCNC: 24.1 MMOL/L (ref 22–29)
CREAT SERPL-MCNC: 0.69 MG/DL (ref 0.76–1.27)
DEPRECATED RDW RBC AUTO: 39.7 FL (ref 37–54)
EGFRCR SERPLBLD CKD-EPI 2021: 118.5 ML/MIN/1.73
EOSINOPHIL # BLD AUTO: 0.14 10*3/MM3 (ref 0–0.4)
EOSINOPHIL NFR BLD AUTO: 2.3 % (ref 0.3–6.2)
ERYTHROCYTE [DISTWIDTH] IN BLOOD BY AUTOMATED COUNT: 12.5 % (ref 12.3–15.4)
ETHANOL BLD-MCNC: <10 MG/DL (ref 0–10)
ETHANOL UR QL: <0.01 %
GEN 5 2HR TROPONIN T REFLEX: 7 NG/L
GLOBULIN UR ELPH-MCNC: 2.8 GM/DL
GLUCOSE BLDC GLUCOMTR-MCNC: 128 MG/DL (ref 70–99)
GLUCOSE SERPL-MCNC: 141 MG/DL (ref 65–99)
HCT VFR BLD AUTO: 42.4 % (ref 37.5–51)
HGB BLD-MCNC: 14.7 G/DL (ref 13–17.7)
HOLD SPECIMEN: NORMAL
HOLD SPECIMEN: NORMAL
IMM GRANULOCYTES # BLD AUTO: 0.01 10*3/MM3 (ref 0–0.05)
IMM GRANULOCYTES NFR BLD AUTO: 0.2 % (ref 0–0.5)
INR PPP: 0.97 (ref 0.86–1.15)
LYMPHOCYTES # BLD AUTO: 1.74 10*3/MM3 (ref 0.7–3.1)
LYMPHOCYTES NFR BLD AUTO: 28 % (ref 19.6–45.3)
MCH RBC QN AUTO: 30.1 PG (ref 26.6–33)
MCHC RBC AUTO-ENTMCNC: 34.7 G/DL (ref 31.5–35.7)
MCV RBC AUTO: 86.9 FL (ref 79–97)
MONOCYTES # BLD AUTO: 0.56 10*3/MM3 (ref 0.1–0.9)
MONOCYTES NFR BLD AUTO: 9 % (ref 5–12)
NEUTROPHILS NFR BLD AUTO: 3.68 10*3/MM3 (ref 1.7–7)
NEUTROPHILS NFR BLD AUTO: 59.2 % (ref 42.7–76)
NRBC BLD AUTO-RTO: 0 /100 WBC (ref 0–0.2)
PLATELET # BLD AUTO: 258 10*3/MM3 (ref 140–450)
PMV BLD AUTO: 10.1 FL (ref 6–12)
POTASSIUM SERPL-SCNC: 3.1 MMOL/L (ref 3.5–5.2)
PROT SERPL-MCNC: 7.7 G/DL (ref 6–8.5)
PROTHROMBIN TIME: 13 SECONDS (ref 11.8–14.9)
RBC # BLD AUTO: 4.88 10*6/MM3 (ref 4.14–5.8)
RH BLD: POSITIVE
RH BLD: POSITIVE
SODIUM SERPL-SCNC: 137 MMOL/L (ref 136–145)
T&S EXPIRATION DATE: NORMAL
TROPONIN T DELTA: -1 NG/L
TROPONIN T SERPL HS-MCNC: 8 NG/L
WBC NRBC COR # BLD: 6.21 10*3/MM3 (ref 3.4–10.8)
WHOLE BLOOD HOLD COAG: NORMAL
WHOLE BLOOD HOLD SPECIMEN: NORMAL

## 2023-07-31 PROCEDURE — 70553 MRI BRAIN STEM W/O & W/DYE: CPT

## 2023-07-31 PROCEDURE — 25010000002 ENOXAPARIN PER 10 MG: Performed by: INTERNAL MEDICINE

## 2023-07-31 PROCEDURE — 86850 RBC ANTIBODY SCREEN: CPT | Performed by: EMERGENCY MEDICINE

## 2023-07-31 PROCEDURE — G0378 HOSPITAL OBSERVATION PER HR: HCPCS

## 2023-07-31 PROCEDURE — 93010 ELECTROCARDIOGRAM REPORT: CPT | Performed by: INTERNAL MEDICINE

## 2023-07-31 PROCEDURE — A9577 INJ MULTIHANCE: HCPCS | Performed by: EMERGENCY MEDICINE

## 2023-07-31 PROCEDURE — 96372 THER/PROPH/DIAG INJ SC/IM: CPT

## 2023-07-31 PROCEDURE — 99285 EMERGENCY DEPT VISIT HI MDM: CPT

## 2023-07-31 PROCEDURE — 85025 COMPLETE CBC W/AUTO DIFF WBC: CPT

## 2023-07-31 PROCEDURE — 84484 ASSAY OF TROPONIN QUANT: CPT | Performed by: INTERNAL MEDICINE

## 2023-07-31 PROCEDURE — 86901 BLOOD TYPING SEROLOGIC RH(D): CPT

## 2023-07-31 PROCEDURE — 99204 OFFICE O/P NEW MOD 45 MIN: CPT | Performed by: PSYCHIATRY & NEUROLOGY

## 2023-07-31 PROCEDURE — 70498 CT ANGIOGRAPHY NECK: CPT

## 2023-07-31 PROCEDURE — 70450 CT HEAD/BRAIN W/O DYE: CPT

## 2023-07-31 PROCEDURE — 0 GADOBENATE DIMEGLUMINE 529 MG/ML SOLUTION: Performed by: EMERGENCY MEDICINE

## 2023-07-31 PROCEDURE — 82948 REAGENT STRIP/BLOOD GLUCOSE: CPT

## 2023-07-31 PROCEDURE — 93005 ELECTROCARDIOGRAM TRACING: CPT | Performed by: EMERGENCY MEDICINE

## 2023-07-31 PROCEDURE — 85610 PROTHROMBIN TIME: CPT | Performed by: EMERGENCY MEDICINE

## 2023-07-31 PROCEDURE — 86901 BLOOD TYPING SEROLOGIC RH(D): CPT | Performed by: EMERGENCY MEDICINE

## 2023-07-31 PROCEDURE — 96365 THER/PROPH/DIAG IV INF INIT: CPT

## 2023-07-31 PROCEDURE — 86900 BLOOD TYPING SEROLOGIC ABO: CPT

## 2023-07-31 PROCEDURE — 36415 COLL VENOUS BLD VENIPUNCTURE: CPT | Performed by: INTERNAL MEDICINE

## 2023-07-31 PROCEDURE — 70496 CT ANGIOGRAPHY HEAD: CPT

## 2023-07-31 PROCEDURE — 96366 THER/PROPH/DIAG IV INF ADDON: CPT

## 2023-07-31 PROCEDURE — 71045 X-RAY EXAM CHEST 1 VIEW: CPT

## 2023-07-31 PROCEDURE — 80053 COMPREHEN METABOLIC PANEL: CPT | Performed by: EMERGENCY MEDICINE

## 2023-07-31 PROCEDURE — 86900 BLOOD TYPING SEROLOGIC ABO: CPT | Performed by: EMERGENCY MEDICINE

## 2023-07-31 PROCEDURE — 25510000001 IOPAMIDOL PER 1 ML: Performed by: EMERGENCY MEDICINE

## 2023-07-31 PROCEDURE — 82077 ASSAY SPEC XCP UR&BREATH IA: CPT | Performed by: EMERGENCY MEDICINE

## 2023-07-31 RX ORDER — BISACODYL 5 MG/1
5 TABLET, DELAYED RELEASE ORAL DAILY PRN
Status: DISCONTINUED | OUTPATIENT
Start: 2023-07-31 | End: 2023-08-01 | Stop reason: HOSPADM

## 2023-07-31 RX ORDER — BISACODYL 10 MG
10 SUPPOSITORY, RECTAL RECTAL DAILY PRN
Status: DISCONTINUED | OUTPATIENT
Start: 2023-07-31 | End: 2023-08-01 | Stop reason: HOSPADM

## 2023-07-31 RX ORDER — POTASSIUM CHLORIDE 750 MG/1
40 CAPSULE, EXTENDED RELEASE ORAL ONCE
Status: COMPLETED | OUTPATIENT
Start: 2023-07-31 | End: 2023-07-31

## 2023-07-31 RX ORDER — AMOXICILLIN 250 MG
2 CAPSULE ORAL 2 TIMES DAILY PRN
Status: DISCONTINUED | OUTPATIENT
Start: 2023-07-31 | End: 2023-08-01 | Stop reason: HOSPADM

## 2023-07-31 RX ORDER — SODIUM CHLORIDE, SODIUM LACTATE, POTASSIUM CHLORIDE, CALCIUM CHLORIDE 600; 310; 30; 20 MG/100ML; MG/100ML; MG/100ML; MG/100ML
100 INJECTION, SOLUTION INTRAVENOUS CONTINUOUS
Status: DISCONTINUED | OUTPATIENT
Start: 2023-07-31 | End: 2023-08-01

## 2023-07-31 RX ORDER — HYDROCHLOROTHIAZIDE 25 MG/1
25 TABLET ORAL DAILY
Status: DISCONTINUED | OUTPATIENT
Start: 2023-07-31 | End: 2023-08-01 | Stop reason: HOSPADM

## 2023-07-31 RX ORDER — ONDANSETRON 4 MG/1
4 TABLET, FILM COATED ORAL EVERY 6 HOURS PRN
Status: DISCONTINUED | OUTPATIENT
Start: 2023-07-31 | End: 2023-08-01 | Stop reason: HOSPADM

## 2023-07-31 RX ORDER — ACETAMINOPHEN 325 MG/1
650 TABLET ORAL EVERY 6 HOURS PRN
Status: DISCONTINUED | OUTPATIENT
Start: 2023-07-31 | End: 2023-08-01 | Stop reason: HOSPADM

## 2023-07-31 RX ORDER — SODIUM CHLORIDE 9 MG/ML
40 INJECTION, SOLUTION INTRAVENOUS AS NEEDED
Status: DISCONTINUED | OUTPATIENT
Start: 2023-07-31 | End: 2023-08-01 | Stop reason: HOSPADM

## 2023-07-31 RX ORDER — SODIUM CHLORIDE 0.9 % (FLUSH) 0.9 %
10 SYRINGE (ML) INJECTION EVERY 12 HOURS SCHEDULED
Status: DISCONTINUED | OUTPATIENT
Start: 2023-07-31 | End: 2023-08-01 | Stop reason: HOSPADM

## 2023-07-31 RX ORDER — LISINOPRIL 20 MG/1
20 TABLET ORAL DAILY
Status: DISCONTINUED | OUTPATIENT
Start: 2023-07-31 | End: 2023-08-01 | Stop reason: HOSPADM

## 2023-07-31 RX ORDER — SODIUM CHLORIDE 0.9 % (FLUSH) 0.9 %
10 SYRINGE (ML) INJECTION AS NEEDED
Status: DISCONTINUED | OUTPATIENT
Start: 2023-07-31 | End: 2023-08-01 | Stop reason: HOSPADM

## 2023-07-31 RX ORDER — LISINOPRIL AND HYDROCHLOROTHIAZIDE 20; 12.5 MG/1; MG/1
1 TABLET ORAL DAILY
COMMUNITY

## 2023-07-31 RX ORDER — SODIUM CHLORIDE 0.9 % (FLUSH) 0.9 %
10 SYRINGE (ML) INJECTION AS NEEDED
Status: DISCONTINUED | OUTPATIENT
Start: 2023-07-31 | End: 2023-07-31

## 2023-07-31 RX ORDER — POLYETHYLENE GLYCOL 3350 17 G/17G
17 POWDER, FOR SOLUTION ORAL DAILY PRN
Status: DISCONTINUED | OUTPATIENT
Start: 2023-07-31 | End: 2023-08-01 | Stop reason: HOSPADM

## 2023-07-31 RX ORDER — ENOXAPARIN SODIUM 100 MG/ML
40 INJECTION SUBCUTANEOUS DAILY
Status: DISCONTINUED | OUTPATIENT
Start: 2023-07-31 | End: 2023-08-01 | Stop reason: HOSPADM

## 2023-07-31 RX ADMIN — IOPAMIDOL 100 ML: 755 INJECTION, SOLUTION INTRAVENOUS at 07:56

## 2023-07-31 RX ADMIN — ENOXAPARIN SODIUM 40 MG: 100 INJECTION SUBCUTANEOUS at 14:22

## 2023-07-31 RX ADMIN — Medication 10 ML: at 14:26

## 2023-07-31 RX ADMIN — POTASSIUM CHLORIDE 40 MEQ: 750 CAPSULE, EXTENDED RELEASE ORAL at 10:14

## 2023-07-31 RX ADMIN — LISINOPRIL 20 MG: 20 TABLET ORAL at 14:21

## 2023-07-31 RX ADMIN — SODIUM CHLORIDE, POTASSIUM CHLORIDE, SODIUM LACTATE AND CALCIUM CHLORIDE 100 ML/HR: 600; 310; 30; 20 INJECTION, SOLUTION INTRAVENOUS at 14:22

## 2023-07-31 RX ADMIN — HYDROCHLOROTHIAZIDE 25 MG: 25 TABLET ORAL at 13:08

## 2023-07-31 RX ADMIN — SODIUM CHLORIDE 5 MG/HR: 9 INJECTION, SOLUTION INTRAVENOUS at 11:21

## 2023-07-31 RX ADMIN — GADOBENATE DIMEGLUMINE 18 ML: 529 INJECTION, SOLUTION INTRAVENOUS at 09:40

## 2023-07-31 RX ADMIN — POTASSIUM CHLORIDE 40 MEQ: 750 CAPSULE, EXTENDED RELEASE ORAL at 14:21
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
## 2023-07-31 NOTE — H&P
[facility]   HOSPITALIST HISTORY AND PHYSICAL  Date: 2023   Patient Name: Nessa Brown  : 1980  MRN: [id number]  Primary Care Physician:  Dahlia, No Known  Date of admission: 2023    Subjective   Subjective     Chief Complaint: Left forearm numbness and left foot numbness, dizziness    HPI:  Nessa Brown is a 42 y.o. Czech-speaking male with essential hypertension and chronic alcohol use that presented to the ED with complaints of left forearm and left leg numbness that began around 0300 on 2023.  History obtained through interview with the patient with use of .  Patient stated that he took a drink of tequila that his friends gave him and then roughly 10 minutes afterwards he began having symptoms.  In association with the left forearm numbness and a little bit of left leg numbness patient also endorsed having some dizziness and blurry vision.  Due to his symptoms patient came to the ED for further evaluation and management.  Evaluation in the ED significant for patient having markedly elevated blood pressure.  Given patient's presentation and concern for stroke teleneurology contacted.  While in the ED patient had MRI brain done which did not show any evidence of acute stroke, CTA head and neck also obtained and no evidence of hemodynamically significant stenosis noted.  Given patient's hypertension he was started on Cardene drip by ED physician.  Hospitalist service contacted for further evaluation and management.    Personal History     Past Medical History:  Essential hypertension  Chronic alcohol use    Past Surgical History:  Throat surgery    Family History:   Mother: Living; history of high blood pressure  Father: Living; no reported history of heart disease, cancer or stroke    Social History:   Tobacco: Denies use  Alcohol: Does drink alcohol, states that he will drink up to 3 beers multiple times a week  Illicit Substances:  Denies use    Home Medications:  lisinopril-hydrochlorothiazide    Allergies:  No Known Allergies    Review of Systems  All systems were reviewed and negative except for:   -Neurological ROS: positive for - dizziness and numbness/tingling    Objective   Objective     Vitals:   Temp:  [97.7 øF (36.5 øC)-98.4 øF (36.9 øC)] 98.4 øF (36.9 øC)  Heart Rate:  [67-89] 88  Resp:  [14-18] 14  BP: (122-221)/() 138/97    Physical Exam    Constitutional: Awake, alert, no acute distress   Eyes: Pupils equal, sclerae anicteric, no conjunctival injection   HENT: NCAT, mucous membranes moist   Neck: Supple, no thyromegaly, no lymphadenopathy, trachea midline   Respiratory: Clear to auscultation bilaterally, nonlabored respirations    Cardiovascular: RRR, no murmurs, rubs, or gallops, palpable pedal pulses bilaterally   Gastrointestinal: Positive bowel sounds, soft, nontender, nondistended   Musculoskeletal: No bilateral ankle edema, no clubbing or cyanosis to extremities   Psychiatric: Appropriate affect, cooperative   Neurologic: Oriented x 3, strength symmetric in all extremities, Cranial Nerves grossly intact, speech clear   Skin: No rashes     Result Review    Result Review:  I have personally reviewed the results from the time of this admission to 7/31/2023 12:53 EDT and agree with these findings:  [x]  Laboratory:  CMP          12/6/2022    01:16 7/31/2023    07:25   CMP   Glucose 108  141    BUN 8  10    Creatinine 0.61  0.69    EGFR 123.8  118.5    Sodium 139  137    Potassium 3.5  3.1    Chloride 97  98    Calcium 9.5  9.3    Total Protein 8.2  7.7    Albumin 5.20  4.9    Globulin 3.0  2.8    Total Bilirubin 0.2  0.2    Alkaline Phosphatase 62  56    AST (SGOT) 30  30    ALT (SGPT) 33  38    Albumin/Globulin Ratio 1.7  1.8    BUN/Creatinine Ratio 13.1  14.5    Anion Gap 15.2  14.9      CBC          12/6/2022    01:16 7/31/2023    07:25   CBC   WBC 6.30  6.21    RBC 5.02  4.88    Hemoglobin 14.8  14.7    Hematocrit  41.7  42.4    MCV 83.1  86.9    MCH 29.5  30.1    MCHC 35.5  34.7    RDW 12.2  12.5    Platelets 247  258      []  Microbiology:  [x]  Radiology: MRI brain imaging reviewed as noted above.  No evidence of acute ischemia.  CTA head neck imaging also reviewed and as noted above no evidence of hemodynamically significant stenosis appreciated.  [x]  EKG/Telemetry:   []  Cardiology/Vascular:   []  Pathology:  []  Old records:  []  Other:      Assessment & Plan   Assessment / Plan     Assessment:  Hypertensive crisis  Hyperglycemia  Chronic ongoing alcohol use    Plan:  -Admit to monitored bed  -Discontinue Cardene drip  -Start patient's home lisinopril and HCTZ.  Increase HCTZ dose  -Start IV fluids  -Echocardiogram ordered  -Repeat troponin  -Monitor electrolytes and renal function with BMP and magnesium level in the AM  -Monitor WBC and Hgb with CBC in the AM  -Clinical course will dictate further management     DVT Prophylaxis: Lovenox  Diet: Regular  Dispo: Admit to monitored bed  Code Status: Full code     Personally reviewed patients labs and imaging, discussed with patient and nurse at bedside. Discussed management with the ED physician (Dr. Andrea Loredo).     Part of this note may be an electronic transcription/translation of spoken language to printed text using the Dragon dictation system.      DVT prophylaxis:  No DVT prophylaxis order currently exists.    CODE STATUS:    Code Status (Patient has no pulse and is not breathing): CPR (Attempt to Resuscitate)  Medical Interventions (Patient has pulse or is breathing): Full Support  Release to patient: Routine Release      Admission Status: I believe this patient meets observation status.    Electronically signed by Harsh Valdes MD, 07/31/23, 12:53 PM EDT.

## 2023-07-31 NOTE — ED PROVIDER NOTES
Time: 7:49 AM EDT  Date of encounter:  7/31/2023  Independent Historian/Clinical History and Information was obtained by:   Patient    History is limited by: N/A    Chief Complaint: Left hand and foot numbness x4 hours        History of Present Illness:  Patient is a 42 y.o. year old male with history of hypertension who presents to the emergency department for evaluation of acute onset of left forearm and left leg numbness going on for the past 3 to 4 hours.    Symptoms started at 3 AM today, about 10 minutes after his friends gave him a drink of tequila with something else in it.    He initially started feeling somewhat dizzy and even had some blurry vision and mildly slurred speech and that is when he noticed the left forearm area of numbness and a bit of numbness in the left leg as well.    He denies any previous history of stroke or TIA, but does take blood pressure medicine daily.    HPI    Patient Care Team  Primary Care Provider: Elina Villagomez Known    Past Medical History:     No Known Allergies  Past Medical History:   Diagnosis Date    Hypertension      Past Surgical History:   Procedure Laterality Date    THROAT SURGERY       History reviewed. No pertinent family history.    Home Medications:  Prior to Admission medications    Medication Sig Start Date End Date Taking? Authorizing Provider   azelastine (ASTELIN) 0.1 % nasal spray 2 sprays into the nostril(s) as directed by provider 2 (Two) Times a Day. Use in each nostril as directed 9/6/22   Elizabeth Salazar APRN   hydroCHLOROthiazide (HYDRODIURIL) 25 MG tablet Take 25 mg by mouth Daily.    ProviderJennifer MD   lisinopril (PRINIVIL,ZESTRIL) 5 MG tablet Take 5 mg by mouth Daily.    ProviderJennifer MD        Social History:   Social History     Tobacco Use    Smoking status: Never    Smokeless tobacco: Never   Vaping Use    Vaping Use: Never used   Substance Use Topics    Alcohol use: Never    Drug use: Never         Review of  "Systems:  Review of Systems   I performed a 10 point review of systems which was all negative, except for the positives found in the HPI above.  Physical Exam:  /94 (BP Location: Right arm, Patient Position: Lying)   Pulse 70   Temp 98.1 øF (36.7 øC) (Oral)   Resp 18   Ht 175.3 cm (69\")   Wt 88.4 kg (194 lb 14.2 oz)   SpO2 97%   BMI 28.78 kg/mý     Physical Exam   General: Awake alert and in no obvious distress    HEENT: Head normocephalic atraumatic, eyes PERRLA EOMI, nose normal, oropharynx normal.    Neck: Supple full range of motion, no meningismus, no lymphadenopathy    Heart: Regular rate and rhythm, no murmurs or rubs, 2+ radial pulses bilaterally    Lungs: Clear to auscultation bilaterally without wheezes or crackles, no respiratory distress    Abdomen: Soft, nontender, nondistended, no rebound or guarding    Skin: Warm, dry, no rash    Musculoskeletal: Normal range of motion, no lower extremity edema    Neurologic: Oriented x3, no motor deficits, but he has mild decrease sensation to light touch over the left forearm as compared to the right.  Normal  strength, no pronator drift or weakness.  Normal speech and no facial droop.    Psychiatric: Mood appears stable, no psychosis          Procedures:  Procedures      Medical Decision Making:      Comorbidities that affect care:    Hypertension    External Notes reviewed:    None      The following orders were placed and all results were independently analyzed by me:  Orders Placed This Encounter   Procedures    CT Head Without Contrast Stroke Protocol    XR Chest 1 View    CT Angiogram Head w AI Analysis of LVO    CT Angiogram Neck    MRI Brain With & Without Contrast    Comprehensive Metabolic Panel    Protime-INR    [place] Draw    CBC Auto Differential    Ethanol    Basic Metabolic Panel    CBC (No Diff)    Magnesium    High Sensitivity Troponin T    High Sensitivity Troponin T    High Sensitivity Troponin T 2Hr    Hemoglobin A1c    Diet: " Cardiac Diets; Healthy Heart (2-3 Na+); Texture: Regular Texture (IDDSI 7); Fluid Consistency: Thin (IDDSI 0)    Undress & Gown    Continuous Pulse Oximetry    Vital Signs    Nursing Swallow Assessment    Vital Signs    Notify Provider (With Default Parameters)    Ambulate Patient    Up in Chair    Up With Assistance    Intake & Output    Weigh Patient    Daily Weights    Oral Care    Saline Lock & Maintain IV Access    Code Status and Medical Interventions:    POC Glucose Once    POC Glucose Once    ECG 12 Lead Stroke Evaluation    Adult Transthoracic Echo Complete W/ Cont if Necessary Per Protocol    Type & Screen    ABO RH Specimen Verification    Insert Peripheral IV    Insert Peripheral IV    Initiate Observation Status    CBC & Differential    Green Top (Gel)    Lavender Top    Gold Top - SST    Light Blue Top       Medications Given in the Emergency Department:  Medications   sodium chloride 0.9 % flush 10 mL (10 mL Intravenous Given 7/31/23 1426)   sodium chloride 0.9 % flush 10 mL (has no administration in time range)   sodium chloride 0.9 % infusion 40 mL (has no administration in time range)   sennosides-docusate (PERICOLACE) 8.6-50 MG per tablet 2 tablet (has no administration in time range)     And   polyethylene glycol (MIRALAX) packet 17 g (has no administration in time range)     And   bisacodyl (DULCOLAX) EC tablet 5 mg (has no administration in time range)     And   bisacodyl (DULCOLAX) suppository 10 mg (has no administration in time range)   ondansetron (ZOFRAN) tablet 4 mg (has no administration in time range)   Enoxaparin Sodium (LOVENOX) syringe 40 mg (40 mg Subcutaneous Given 7/31/23 1422)   lactated ringers infusion (100 mL/hr Intravenous New Bag 7/31/23 1422)   acetaminophen (TYLENOL) tablet 650 mg (has no administration in time range)   lisinopril (PRINIVIL,ZESTRIL) tablet 20 mg (20 mg Oral Given 7/31/23 1421)   hydroCHLOROthiazide (HYDRODIURIL) tablet 25 mg (25 mg Oral Given 7/31/23 1308)    iopamidol (ISOVUE-370) 76 % injection 100 mL (100 mL Intravenous Given 7/31/23 0756)   gadobenate dimeglumine (MULTIHANCE) injection 20 mL (18 mL Intravenous Given 7/31/23 0940)   potassium chloride (MICRO-K) CR capsule 40 mEq (40 mEq Oral Given 7/31/23 1014)   potassium chloride (MICRO-K) CR capsule 40 mEq (40 mEq Oral Given 7/31/23 1421)        ED Course:    ED Course as of 07/31/23 1638   Mon Jul 31, 2023   0833 EKG: I interpreted his twelve-lead EKG is normal sinus rhythm at 70 bpm, borderline T wave abnormalities in the inferior leads, otherwise normal P waves and QRS and ST segments. [VS]      ED Course User Index  [VS] Andrea Loredo MD       Labs:    Lab Results (last 24 hours)       Procedure Component Value Units Date/Time    POC Glucose Once [[id number]]  (Abnormal) Collected: 07/31/23 0720    Specimen: Blood Updated: 07/31/23 0721     Glucose 128 mg/dL      Comment: Serial Number: [id number]:  [id number]       CBC & Differential [[id number]]  (Normal) Collected: 07/31/23 0725    Specimen: Blood Updated: 07/31/23 0731    Narrative:      The following orders were created for panel order CBC & Differential.  Procedure                               Abnormality         Status                     ---------                               -----------         ------                     CBC Auto Differential[[id number]]        Normal              Final result                 Please view results for these tests on the individual orders.    Comprehensive Metabolic Panel [[id number]]  (Abnormal) Collected: 07/31/23 0725    Specimen: Blood Updated: 07/31/23 0756     Glucose 141 mg/dL      BUN 10 mg/dL      Creatinine 0.69 mg/dL      Sodium 137 mmol/L      Potassium 3.1 mmol/L      Chloride 98 mmol/L      CO2 24.1 mmol/L      Calcium 9.3 mg/dL      Total Protein 7.7 g/dL      Albumin 4.9 g/dL      ALT (SGPT) 38 U/L      AST (SGOT) 30 U/L      Alkaline Phosphatase 56 U/L      Total Bilirubin 0.2 mg/dL      Globulin  2.8 gm/dL      A/G Ratio 1.8 g/dL      BUN/Creatinine Ratio 14.5     Anion Gap 14.9 mmol/L      eGFR 118.5 mL/min/1.73     Narrative:      GFR Normal >60  Chronic Kidney Disease <60  Kidney Failure <15      Protime-INR [[id number]]  (Normal) Collected: 07/31/23 0725    Specimen: Blood Updated: 07/31/23 0812     Protime 13.0 Seconds      INR 0.97    Narrative:      Suggested Therapeutic Ranges For Oral Anticoagulant Therapy:  Level of Therapy                      INR Target Range  Standard Dose                            2.0-3.0  High Dose                                2.5-3.5  Patients not receiving anticoagulant  Therapy Normal Range                     0.86-1.15    CBC Auto Differential [[id number]]  (Normal) Collected: 07/31/23 0725    Specimen: Blood Updated: 07/31/23 0731     WBC 6.21 10*3/mm3      RBC 4.88 10*6/mm3      Hemoglobin 14.7 g/dL      Hematocrit 42.4 %      MCV 86.9 fL      MCH 30.1 pg      MCHC 34.7 g/dL      RDW 12.5 %      RDW-SD 39.7 fl      MPV 10.1 fL      Platelets 258 10*3/mm3      Neutrophil % 59.2 %      Lymphocyte % 28.0 %      Monocyte % 9.0 %      Eosinophil % 2.3 %      Basophil % 1.3 %      Immature Grans % 0.2 %      Neutrophils, Absolute 3.68 10*3/mm3      Lymphocytes, Absolute 1.74 10*3/mm3      Monocytes, Absolute 0.56 10*3/mm3      Eosinophils, Absolute 0.14 10*3/mm3      Basophils, Absolute 0.08 10*3/mm3      Immature Grans, Absolute 0.01 10*3/mm3      nRBC 0.0 /100 WBC     Ethanol [[id number]] Collected: 07/31/23 0725    Specimen: Blood Updated: 07/31/23 0809     Ethanol <10 mg/dL      Ethanol % <0.010 %     Narrative:      Ethanol (Plasma)  <10 Essentially Negative    Toxic Concentrations           mg/dL    Flushing, slowing of reflexes    Impaired visual activity         Depression of CNS              >100  Possible Coma                  >300       High Sensitivity Troponin T [[id number]]  (Normal) Collected: 07/31/23 0725    Specimen: Blood Updated: 07/31/23 1307      HS Troponin T 8 ng/L     Narrative:      High Sensitive Troponin T Reference Range:  <10.0 ng/L- Negative Female for AMI  <15.0 ng/L- Negative Male for AMI  >=10 - Abnormal Female indicating possible myocardial injury.  >=15 - Abnormal Male indicating possible myocardial injury.   Clinicians would have to utilize clinical acumen, EKG, Troponin, and serial changes to determine if it is an Acute Myocardial Infarction or myocardial injury due to an underlying chronic condition.         High Sensitivity Troponin T 2Hr [[id number]]  (Normal) Collected: 07/31/23 1324    Specimen: Blood from Arm, Right Updated: 07/31/23 1419     HS Troponin T 7 ng/L      Troponin T Delta -1 ng/L     Narrative:      High Sensitive Troponin T Reference Range:  <10.0 ng/L- Negative Female for AMI  <15.0 ng/L- Negative Male for AMI  >=10 - Abnormal Female indicating possible myocardial injury.  >=15 - Abnormal Male indicating possible myocardial injury.   Clinicians would have to utilize clinical acumen, EKG, Troponin, and serial changes to determine if it is an Acute Myocardial Infarction or myocardial injury due to an underlying chronic condition.                  Imaging:    CT Angiogram Neck    Result Date: 7/31/2023  PROCEDURE: CT ANGIOGRAM HEAD W AI ANALYSIS OF LVO, 7/31/2023, 7:47 CT ANGIOGRAM NECK, 7/31/2023, 7:47  COMPARISON: [facility], CT, CT HEAD WO CONTRAST STROKE PROTOCOL, 7/31/2023, 7:28.  INDICATIONS: STROKE ALERT WITH LEFT SIDED NUMBNESS  PROTOCOL:   Standard imaging protocol performed    RADIATION:   DLP: 588.2mGy*cm   Automated exposure control was utilized to minimize radiation dose. CONTRAST: 100cc Isovue 370 I.V. RAPID: CTA imaging was analyzed using RAPID AI to enable computer assisted triage notification to rapidly detect a large vessel occlusion (LVO) and shorten notification time  TECHNIQUE: After obtaining the patient's consent, CT images of the head and neck were obtained without and with non-ionic  contrast, and multi-planar/3-D imaging were created and interpreted to optimize visualization of vascular anatomy.   FINDINGS:  Vascular: CTA neck:  Visualized aortic arch is unremarkable appearance.  There is some limitation secondary to bolus from the left upper extremity and associated streak artifact.  The visualized left subclavian artery is grossly unremarkable in appearance.  Left vertebral artery is tortuous at its origin in appears grossly unremarkable along its cervical course of the base of the neck.  The right innominate artery and right subclavian artery are unremarkable.  Right vertebral artery is unremarkable to the base of the neck.  Origin of the left common carotid artery is somewhat limited by streak artifact.  The common carotid artery, bifurcation, external carotid artery and cervical course of the internal carotid artery appear patent.  The right common carotid artery, bifurcation, external carotid artery and cervical course of the internal carotid artery are grossly unremarkable.  CTA head:  Right vertebral artery is dominant.  There is focal narrowing of the proximal segment of the left internal carotid artery which appears patent.  The basilar artery and bilateral posterior cerebral arteries patent.  The left internal carotid artery is patent without flow limiting stenosis.  Right internal carotid artery demonstrates some mild focal narrowing anteriorly within the cavernous segment just prior to the distal internal carotid artery which is mildly narrowed.  The anterior cerebral arteries are patent.  Middle cerebral arteries appear patent.  No evidence of significant stenosis, large vessel occlusion or aneurysm formation otherwise noted. Nonvascular:  No abnormal areas of enhancement noted within the brain parenchyma.  The orbits and limited imaging of the soft tissues of the head neck are grossly unremarkable in appearance.  The visualized paranasal sinuses and mastoid air cells are grossly  clear.  Mild degenerative changes noted of the spine.  Limited imaging of the upper chest demonstrates no definite acute abnormality of the visualized lungs.  Small lymph nodes within the right hilar region measuring 1.4 x 1.2 cm slightly prominent size are likely reactive.            1. No evidence of large vessel occlusion, significant stenosis or aneurysm formation. 2. Mild stenosis noted in the proximal intracranial segment of the left vertebral artery and mild changes noted in the cranial portions of the internal carotid arteries bilaterally 3. Mild right hilar adenopathy likely reactive.  Consider 3 to six-month follow-up as clinically indicated     RADAMES OSORIO MD       Electronically Signed and Approved By: RADAMES OSORIO MD on 7/31/2023 at 9:07             MRI Brain With & Without Contrast    Result Date: 7/31/2023  PROCEDURE: MRI BRAIN W WO CONTRAST  COMPARISON:  CT same day and prior  INDICATIONS: Left arm numb,tingling; elevated BP.  CONTRAST: 18ML  Multihance I.V.  TECHNIQUE: A variety of imaging planes and parameters were utilized for visualization of suspected pathology.  Images were performed without and with gadolinium contrast.  FINDINGS:  Diffusion-weighted imaging demonstrates no acute restriction abnormality.  Scattered punctate air is of FLAIR and T2 signal hyperintensity noted within the supratentorial white matter.  No definite associated postcontrast enhancement to suggest an area of active demyelinization.  No acute intracranial hemorrhage or mass effect is noted.  The ventricles, cisterns and sulci appear unremarkable for age.  Major intracranial flow voids are patent.  Globes and orbits appear unremarkable.  Paranasal sinuses are well aerated.  Mastoid air cells are unremarkable.        1. No acute ischemic change 2. Scattered white matter changes which are nonspecific but can be seen with small vessel ischemic disease, sequela of demyelinating disease, sequela migraine headaches,  post inflammatory/infectious change, sequela vasculitis, among other etiologies.  Please correlate clinically. 3. No abnormal areas of postcontrast enhancement noted.      RADAMES OSORIO MD       Electronically Signed and Approved By: RADAMES OSORIO MD on 7/31/2023 at 10:15             XR Chest 1 View    Result Date: 7/31/2023  PROCEDURE: XR CHEST 1 VW  COMPARISON: [facility], CR, XR CHEST 1 VW, 1/26/2022, 1:18.  INDICATIONS: Stroke Protocol (Onset > 12 hrs)  FINDINGS:  LUNGS: Normal.  No significant pulmonary parenchymal abnormalities.  VASCULATURE: Normal.  Unremarkable pulmonary vasculature.  CARDIAC: Normal.  No cardiac silhouette abnormality or cardiomegaly.  MEDIASTINUM: Normal.  No visible mass or adenopathy.  PLEURA: Normal.  No effusion or pleural thickening.  BONES: Normal.  No fracture or visible bony lesion.  OTHER: Negative.         1. No acute process       RADAMES OSORIO MD       Electronically Signed and Approved By: RADAMES OSORIO MD on 7/31/2023 at 9:33             CT Head Without Contrast Stroke Protocol    Result Date: 7/31/2023  PROCEDURE: CT HEAD WO CONTRAST STROKE PROTOCOL  COMPARISON:  [facility], CT, CT HEAD WO CONTRAST, 12/06/2022, 3:15. INDICATIONS: STROKE ALERT WITH LEFT SIDED NUMBNESS  PROTOCOL:   Standard imaging protocol performed    RADIATION:   DLP: 954.9mGy*cm   MA and/or KV was adjusted to minimize radiation dose.     TECHNIQUE: After obtaining the patient's consent, CT images were obtained without non-ionic intravenous contrast material.  FINDINGS:  Brain:  No acute intracranial hemorrhage or mass effect is noted.  The ventricles, cisterns and sulci appear within normal limits.  Gray-white differentiation is maintained.  No suspicious extra-axial fluid collection noted.  Orbits:  Orbits are grossly unremarkable and periorbital soft tissues appear grossly unremarkable.  Sinuses:  Paranasal sinuses are clear.  Mastoid air cells are clear.   Calvarium and soft tissues:  Bony calvarium is intact.  Soft tissues are grossly unremarkable in appearance.         1. No acute intracranial abnormality   Findings were discussed with Dr. Lazo at 7:41a.m.     RADAMES OSORIO MD       Electronically Signed and Approved By: RADAMES OSORIO MD on 7/31/2023 at 7:46             CT Angiogram Head w AI Analysis of LVO    Result Date: 7/31/2023  PROCEDURE: CT ANGIOGRAM HEAD W AI ANALYSIS OF LVO, 7/31/2023, 7:47 CT ANGIOGRAM NECK, 7/31/2023, 7:47  COMPARISON: [facility], CT, CT HEAD WO CONTRAST STROKE PROTOCOL, 7/31/2023, 7:28.  INDICATIONS: STROKE ALERT WITH LEFT SIDED NUMBNESS  PROTOCOL:   Standard imaging protocol performed    RADIATION:   DLP: 588.2mGy*cm   Automated exposure control was utilized to minimize radiation dose. CONTRAST: 100cc Isovue 370 I.V. RAPID: CTA imaging was analyzed using RAPID AI to enable computer assisted triage notification to rapidly detect a large vessel occlusion (LVO) and shorten notification time  TECHNIQUE: After obtaining the patient's consent, CT images of the head and neck were obtained without and with non-ionic contrast, and multi-planar/3-D imaging were created and interpreted to optimize visualization of vascular anatomy.   FINDINGS:  Vascular: CTA neck:  Visualized aortic arch is unremarkable appearance.  There is some limitation secondary to bolus from the left upper extremity and associated streak artifact.  The visualized left subclavian artery is grossly unremarkable in appearance.  Left vertebral artery is tortuous at its origin in appears grossly unremarkable along its cervical course of the base of the neck.  The right innominate artery and right subclavian artery are unremarkable.  Right vertebral artery is unremarkable to the base of the neck.  Origin of the left common carotid artery is somewhat limited by streak artifact.  The common carotid artery, bifurcation, external carotid artery and cervical  course of the internal carotid artery appear patent.  The right common carotid artery, bifurcation, external carotid artery and cervical course of the internal carotid artery are grossly unremarkable.  CTA head:  Right vertebral artery is dominant.  There is focal narrowing of the proximal segment of the left internal carotid artery which appears patent.  The basilar artery and bilateral posterior cerebral arteries patent.  The left internal carotid artery is patent without flow limiting stenosis.  Right internal carotid artery demonstrates some mild focal narrowing anteriorly within the cavernous segment just prior to the distal internal carotid artery which is mildly narrowed.  The anterior cerebral arteries are patent.  Middle cerebral arteries appear patent.  No evidence of significant stenosis, large vessel occlusion or aneurysm formation otherwise noted. Nonvascular:  No abnormal areas of enhancement noted within the brain parenchyma.  The orbits and limited imaging of the soft tissues of the head neck are grossly unremarkable in appearance.  The visualized paranasal sinuses and mastoid air cells are grossly clear.  Mild degenerative changes noted of the spine.  Limited imaging of the upper chest demonstrates no definite acute abnormality of the visualized lungs.  Small lymph nodes within the right hilar region measuring 1.4 x 1.2 cm slightly prominent size are likely reactive.            1. No evidence of large vessel occlusion, significant stenosis or aneurysm formation. 2. Mild stenosis noted in the proximal intracranial segment of the left vertebral artery and mild changes noted in the cranial portions of the internal carotid arteries bilaterally 3. Mild right hilar adenopathy likely reactive.  Consider 3 to six-month follow-up as clinically indicated     RADAMES OSORIO MD       Electronically Signed and Approved By: RADAMES OSORIO MD on 7/31/2023 at 9:07                Differential Diagnosis and  Discussion:    Paresthesia: Differential diagnosis includes but is not limited to acute stroke, electrolyte imbalance, anxiety, radiculopathy, autoimmune disorders, and endocrine disorders.  Stroke: Differential diagnosis in this patient with signs of possible ischemic stroke include TIA or ischemic stroke, hemorrhagic stroke, hypoglycemic episode, toxic or metabolic encephalopathy, paresthesias.    All labs were reviewed and interpreted by me.  All X-rays impressions were independently interpreted by me.  EKG was interpreted by me.  CT scan radiology impression was interpreted by me.  MRI impression was interpreted by me.     MDM     Amount and/or Complexity of Data Reviewed  Clinical lab tests: reviewed  Tests in the radiology section of [facility]: reviewed  Tests in the medicine section of [facility]: reviewed           This patient is a 42-year-old male with severely elevated blood pressure of 220s over 130s on arrival now presenting with new left forearm numbness and some intermittent blurry vision and slurred speech and dizziness starting 4 hours ago.    It sounds like his symptoms started shortly after taking a drink from his friends that contain tequila and possibly something else in it, so I question possible adverse side effect of alcohol or another ingestion.      Given his left-sided numbness we made him a stroke intervention and he was seen by teleneurology.    His NIH stroke scale is only a 1 for mild abnormal sensory to the left extremity.    He does not meet criteria for TNKase.    He was already seen by the telemetry neurologist and they recommend nicardipine drip for some blood pressure control, but not to take systolic blood pressure less than 210, in case he is actually having an acute ischemic stroke.    They also recommended getting CT and CTA head and neck as well as MRI of the brain to rule out stroke.      MRI came back negative so I presume that most of his symptoms are due to severely elevated blood  pressure so we will continue nicardipine drip and plan to admit him for further blood pressure control.      Critical Care Note: Total Critical Care time of 35 minutes. Total critical care time documented does not include time spent on separately billed procedures for services of nurses or physician assistants. I personally saw and examined the patient. I have reviewed all diagnostic interpretations and treatment plans as written. I was present for the key portions of any procedures performed and the inclusive time noted in any critical care statement. Critical care time includes patient management by me, time spent at the patients bedside,  time to review lab and imaging results, discussing patient care, documentation in the medical record, and time spent with family or caregiver.    Patient Care Considerations:          Consultants/Shared Management Plan:    This plan of management and blood pressure control was discussed with the on-call teleneurologist.    Social Determinants of Health:    Patient is independent, reliable, and has access to care.       Disposition and Care Coordination:    Admit:   Through independent evaluation of the patient's history, physical, and imperical data, the patient meets criteria for observation/admission to the hospital.        Final diagnoses:   Hypertensive urgency   Left arm numbness   Hypokalemia        ED Disposition       ED Disposition   Decision to Admit    Condition   --    Comment   Level of Care: Telemetry [5]   Diagnosis: Hypertensive crisis [[id number]]                 This medical record created using voice recognition software.             Andrea Loredo MD  07/31/23 [id number]

## 2023-08-01 ENCOUNTER — APPOINTMENT (OUTPATIENT)
Dept: CARDIOLOGY | Facility: HOSPITAL | Age: 43
End: 2023-08-01

## 2023-08-01 ENCOUNTER — READMISSION MANAGEMENT (OUTPATIENT)
Dept: CALL CENTER | Facility: HOSPITAL | Age: 43
End: 2023-08-01

## 2023-08-01 VITALS
RESPIRATION RATE: 17 BRPM | HEART RATE: 58 BPM | OXYGEN SATURATION: 95 % | WEIGHT: 194.89 LBS | HEIGHT: 69 IN | BODY MASS INDEX: 28.87 KG/M2 | TEMPERATURE: 97.9 F | DIASTOLIC BLOOD PRESSURE: 94 MMHG | SYSTOLIC BLOOD PRESSURE: 168 MMHG

## 2023-08-01 LAB
ANION GAP SERPL CALCULATED.3IONS-SCNC: 9.9 MMOL/L (ref 5–15)
BH CV ECHO MEAS - AO MAX PG: 10 MMHG
BH CV ECHO MEAS - AO MEAN PG: 5 MMHG
BH CV ECHO MEAS - AO ROOT DIAM: 3.1 CM
BH CV ECHO MEAS - AO V2 MAX: 159 CM/SEC
BH CV ECHO MEAS - AO V2 VTI: 34 CM
BH CV ECHO MEAS - AVA(I,D): 2.04 CM2
BH CV ECHO MEAS - EDV(CUBED): 79.5 ML
BH CV ECHO MEAS - EDV(MOD-SP2): 86.2 ML
BH CV ECHO MEAS - EDV(MOD-SP4): 82.4 ML
BH CV ECHO MEAS - EF(MOD-BP): 63.2 %
BH CV ECHO MEAS - EF(MOD-SP2): 60.2 %
BH CV ECHO MEAS - EF(MOD-SP4): 64.1 %
BH CV ECHO MEAS - ESV(CUBED): 22 ML
BH CV ECHO MEAS - ESV(MOD-SP2): 34.3 ML
BH CV ECHO MEAS - ESV(MOD-SP4): 29.6 ML
BH CV ECHO MEAS - FS: 34.9 %
BH CV ECHO MEAS - IVS/LVPW: 1.09 CM
BH CV ECHO MEAS - IVSD: 1.2 CM
BH CV ECHO MEAS - LA DIMENSION: 3.6 CM
BH CV ECHO MEAS - LAT PEAK E' VEL: 12.6 CM/SEC
BH CV ECHO MEAS - LV MASS(C)D: 173.6 GRAMS
BH CV ECHO MEAS - LV MAX PG: 4.8 MMHG
BH CV ECHO MEAS - LV MEAN PG: 2 MMHG
BH CV ECHO MEAS - LV V1 MAX: 110 CM/SEC
BH CV ECHO MEAS - LV V1 VTI: 24.5 CM
BH CV ECHO MEAS - LVIDD: 4.3 CM
BH CV ECHO MEAS - LVIDS: 2.8 CM
BH CV ECHO MEAS - LVOT AREA: 2.8 CM2
BH CV ECHO MEAS - LVOT DIAM: 1.9 CM
BH CV ECHO MEAS - LVPWD: 1.1 CM
BH CV ECHO MEAS - MED PEAK E' VEL: 9.4 CM/SEC
BH CV ECHO MEAS - MV A MAX VEL: 71.3 CM/SEC
BH CV ECHO MEAS - MV DEC SLOPE: 568 CM/SEC2
BH CV ECHO MEAS - MV DEC TIME: 0.24 MSEC
BH CV ECHO MEAS - MV E MAX VEL: 87 CM/SEC
BH CV ECHO MEAS - MV E/A: 1.22
BH CV ECHO MEAS - MV MAX PG: 3.5 MMHG
BH CV ECHO MEAS - MV MEAN PG: 1 MMHG
BH CV ECHO MEAS - MV P1/2T: 48.8 MSEC
BH CV ECHO MEAS - MV V2 VTI: 37.2 CM
BH CV ECHO MEAS - MVA(P1/2T): 4.5 CM2
BH CV ECHO MEAS - MVA(VTI): 1.87 CM2
BH CV ECHO MEAS - RAP SYSTOLE: 3 MMHG
BH CV ECHO MEAS - RVSP: 36 MMHG
BH CV ECHO MEAS - SV(LVOT): 69.5 ML
BH CV ECHO MEAS - SV(MOD-SP2): 51.9 ML
BH CV ECHO MEAS - SV(MOD-SP4): 52.8 ML
BH CV ECHO MEAS - TR MAX PG: 32.9 MMHG
BH CV ECHO MEAS - TR MAX VEL: 287 CM/SEC
BH CV ECHO MEASUREMENTS AVERAGE E/E' RATIO: 7.91
BUN SERPL-MCNC: 12 MG/DL (ref 6–20)
BUN/CREAT SERPL: 15.8 (ref 7–25)
CALCIUM SPEC-SCNC: 9.2 MG/DL (ref 8.6–10.5)
CHLORIDE SERPL-SCNC: 104 MMOL/L (ref 98–107)
CHOLEST SERPL-MCNC: 261 MG/DL (ref 0–200)
CO2 SERPL-SCNC: 26.1 MMOL/L (ref 22–29)
CREAT SERPL-MCNC: 0.76 MG/DL (ref 0.76–1.27)
DEPRECATED RDW RBC AUTO: 43 FL (ref 37–54)
EGFRCR SERPLBLD CKD-EPI 2021: 115.1 ML/MIN/1.73
ERYTHROCYTE [DISTWIDTH] IN BLOOD BY AUTOMATED COUNT: 12.6 % (ref 12.3–15.4)
GLUCOSE SERPL-MCNC: 127 MG/DL (ref 65–99)
HBA1C MFR BLD: 6.3 % (ref 4.8–5.6)
HCT VFR BLD AUTO: 43.4 % (ref 37.5–51)
HDLC SERPL-MCNC: 48 MG/DL (ref 40–60)
HGB BLD-MCNC: 14.1 G/DL (ref 13–17.7)
IVRT: 66 MSEC
LDLC SERPL CALC-MCNC: 115 MG/DL (ref 0–100)
LDLC/HDLC SERPL: 2.13 {RATIO}
LEFT ATRIUM VOLUME INDEX: 22.2 ML/M2
LEFT ATRIUM VOLUME: 45.3 ML
MAGNESIUM SERPL-MCNC: 2 MG/DL (ref 1.6–2.6)
MCH RBC QN AUTO: 30.2 PG (ref 26.6–33)
MCHC RBC AUTO-ENTMCNC: 32.5 G/DL (ref 31.5–35.7)
MCV RBC AUTO: 92.9 FL (ref 79–97)
PLATELET # BLD AUTO: 246 10*3/MM3 (ref 140–450)
PMV BLD AUTO: 10.2 FL (ref 6–12)
POTASSIUM SERPL-SCNC: 4 MMOL/L (ref 3.5–5.2)
RBC # BLD AUTO: 4.67 10*6/MM3 (ref 4.14–5.8)
SODIUM SERPL-SCNC: 140 MMOL/L (ref 136–145)
TRIGL SERPL-MCNC: 555 MG/DL (ref 0–150)
TROPONIN T SERPL HS-MCNC: 7 NG/L
VLDLC SERPL-MCNC: 98 MG/DL (ref 5–40)
WBC NRBC COR # BLD: 5.26 10*3/MM3 (ref 3.4–10.8)

## 2023-08-01 PROCEDURE — 80048 BASIC METABOLIC PNL TOTAL CA: CPT | Performed by: INTERNAL MEDICINE

## 2023-08-01 PROCEDURE — 80061 LIPID PANEL: CPT | Performed by: INTERNAL MEDICINE

## 2023-08-01 PROCEDURE — G0378 HOSPITAL OBSERVATION PER HR: HCPCS

## 2023-08-01 PROCEDURE — 93306 TTE W/DOPPLER COMPLETE: CPT | Performed by: INTERNAL MEDICINE

## 2023-08-01 PROCEDURE — 83036 HEMOGLOBIN GLYCOSYLATED A1C: CPT | Performed by: INTERNAL MEDICINE

## 2023-08-01 PROCEDURE — 99213 OFFICE O/P EST LOW 20 MIN: CPT | Performed by: PSYCHIATRY & NEUROLOGY

## 2023-08-01 PROCEDURE — 83735 ASSAY OF MAGNESIUM: CPT | Performed by: INTERNAL MEDICINE

## 2023-08-01 PROCEDURE — 93306 TTE W/DOPPLER COMPLETE: CPT

## 2023-08-01 PROCEDURE — 84484 ASSAY OF TROPONIN QUANT: CPT | Performed by: INTERNAL MEDICINE

## 2023-08-01 PROCEDURE — 85027 COMPLETE CBC AUTOMATED: CPT | Performed by: INTERNAL MEDICINE

## 2023-08-01 PROCEDURE — 25010000002 ENOXAPARIN PER 10 MG: Performed by: INTERNAL MEDICINE

## 2023-08-01 PROCEDURE — 96372 THER/PROPH/DIAG INJ SC/IM: CPT

## 2023-08-01 RX ORDER — ATORVASTATIN CALCIUM 40 MG/1
40 TABLET, FILM COATED ORAL DAILY
Qty: 90 TABLET | Refills: 0 | Status: SHIPPED | OUTPATIENT
Start: 2023-08-01

## 2023-08-01 RX ORDER — ASPIRIN 81 MG/1
81 TABLET ORAL DAILY
Qty: 90 TABLET | Refills: 0 | Status: SHIPPED | OUTPATIENT
Start: 2023-08-01 | End: 2023-10-30

## 2023-08-01 RX ORDER — AMLODIPINE BESYLATE 10 MG/1
10 TABLET ORAL DAILY
Qty: 30 TABLET | Refills: 0 | Status: SHIPPED | OUTPATIENT
Start: 2023-08-01 | End: 2023-08-31

## 2023-08-01 RX ADMIN — ENOXAPARIN SODIUM 40 MG: 100 INJECTION SUBCUTANEOUS at 09:05

## 2023-08-01 RX ADMIN — HYDROCHLOROTHIAZIDE 25 MG: 25 TABLET ORAL at 09:04

## 2023-08-01 RX ADMIN — LISINOPRIL 20 MG: 20 TABLET ORAL at 09:04

## 2023-08-01 NOTE — CASE MANAGEMENT/SOCIAL WORK
Discharge Planning Assessment   Dae     Patient Name: Nessa Brown  MRN: [id number]  Today's Date: 8/1/2023    Admit Date: 7/31/2023    Plan: ALEJANDRINA spoke with Pt using  in room. 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 Pt denies fin. concerns at this time. Pt does not have Ins , Med assist seen Pt in the emergency room. Pt plans to return home at discharge. At this time Pt can afford his medications, Pharm: [facility]. Pt does not have PCP set up at this time, he does see a Cardiologist. SW/CM will continue to follow for dishcarge needs.   Discharge Needs Assessment       Row Name 08/01/23 1117       Living Environment    People in Home significant other    Current Living Arrangements home    Potentially Unsafe Housing Conditions none    Primary Care Provided by self    Provides Primary Care For child(holly)    Family Caregiver if Needed significant other    Quality of Family Relationships involved;supportive       Resource/Environmental Concerns    Resource/Environmental Concerns none    Transportation Concerns none       Food Insecurity    Within the past 12 months, you worried that your food would run out before you got the money to buy more. Never true    Within the past 12 months, the food you bought just didn't last and you didn't have money to get more. Never true       Transition Planning    Patient/Family Anticipates Transition to home    Patient/Family Anticipated Services at Transition none    Transportation Anticipated family or friend will provide       Discharge Needs Assessment    Readmission Within the Last 30 Days no previous admission in last 30 days    Equipment Currently Used at Home none    Concerns to be Addressed no discharge needs identified    Discharge Coordination/Progress ALEJANDRINA spoke with Pt using  in room. 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 Pt denies fin. concerns at this time. Pt does not have Ins , Med assist seen Pt in the emergency  room. Pt plans to return home at discharge. At this time Pt can afford his medications, Pharm: [facility]. Pt does not have PCP set up at this time, he does see a Cardiologist.  SW/CM will continue to follow for dishcarge needs.                   Discharge Plan       Row Name 08/01/23 1127       Plan    Plan SW spoke with Pt using  in room. 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 Pt denies fin. concerns at this time. Pt does not have Ins , Med assist seen Pt in the emergency room. Pt plans to return home at discharge. At this time Pt can afford his medications, Pharm: [facility]. Pt does not have PCP set up at this time, he does see a Cardiologist. SW/CM will continue to follow for dishcarge needs.                  Continued Care and Services - Admitted Since 7/31/2023    Coordination has not been started for this encounter.          Demographic Summary       Row Name 08/01/23 1109       General Information    Admission Type observation    Arrived From emergency department    Referral Source admission list    Reason for Consult discharge planning    Preferred Language Eritrean       Contact Information    Permission Granted to Share Info With permission denied                   Functional Status       Row Name 08/01/23 1116       Functional Status    Usual Activity Tolerance excellent    Current Activity Tolerance excellent       Physical Activity    On average, how many days per week do you engage in moderate to strenuous exercise (like a brisk walk)? 0 days    On average, how many minutes do you engage in exercise at this level? 0 min    Number of minutes of exercise per week 0       Assessment of Health Literacy    How often do you have someone help you read hospital materials? Never    How often do you have problems learning about your medical condition because of difficulty understanding written information? Never    How often do you have a problem understanding what is told to you about  your medical condition? Never    How confident are you filling out medical forms by yourself? Quite a bit    Health Literacy Good       Functional Status, IADL    Medications independent    Meal Preparation independent    Housekeeping independent    Laundry independent    Shopping independent       Mental Status    General Appearance WDL WDL       Mental Status Summary    Recent Changes in Mental Status/Cognitive Functioning no changes       Employment/    Employment Status employed full-time    Current or Previous Occupation not applicable                   Psychosocial    No documentation.                  Abuse/Neglect    No documentation.                  Legal       Row Name 08/01/23 1117       Financial Resource Strain    How hard is it for you to pay for the very basics like food, housing, medical care, and heating? Not very       Financial/Legal    Source of Income salary/wages    Application for Public Assistance not applied       Legal    Criminal Activity/Legal Involvement none                   Substance Abuse    No documentation.                  Patient Forms    No documentation.                     Akanksha Juarez

## 2023-08-01 NOTE — PLAN OF CARE
Goal Outcome Evaluation:         No changes this shift, pt doing well. Echo being done now, waiting on tele neuro to come see pt.

## 2023-08-01 NOTE — PLAN OF CARE
Goal Outcome Evaluation:  used for interactions- Pt aaox4 able to make wants and needs known, B/p at beginning of shift 174/105 Lindsay RAMOS notified NNO

## 2023-08-01 NOTE — DISCHARGE SUMMARY
[facility]         HOSPITALIST  DISCHARGE SUMMARY    Patient Name: Nessa Brown    : 1980    MRN: [id number]    Date of Admission: 2023  Date of Discharge:  23  Primary Care Physician: Provider, No Known    Consults       No orders found for last 30 day(s).            Final Diagnosis:  Hypertensive crisis  Hyperlipidemia  Prediabetes A1c 6.3  Chronic ongoing alcohol use    Hospital Course     HPI:  Nessa Brown is a 42 y.o. Barbadian-speaking male with essential hypertension and chronic alcohol use that presented to the ED with complaints of left forearm and left leg numbness that began around 0300 on 2023.  History obtained through interview with the patient with use of .  Patient stated that he took a drink of tequila that his friends gave him and then roughly 10 minutes afterwards he began having symptoms.  In association with the left forearm numbness and a little bit of left leg numbness patient also endorsed having some dizziness and blurry vision.  Due to his symptoms patient came to the ED for further evaluation and management.  Evaluation in the ED significant for patient having markedly elevated blood pressure.  Given patient's presentation and concern for stroke teleneurology contacted.  While in the ED patient had MRI brain done which did not show any evidence of acute stroke, CTA head and neck also obtained and no evidence of hemodynamically significant stenosis noted.  Given patient's hypertension he was started on Cardene drip by ED physician.  Hospitalist service contacted for further evaluation and management.     Hospital Course: Patient started on adjusted oral antihypertensive medications.  Symptoms had resolved.  He received potassium for hypokalemia initially and discussed with patient with diet adjustments as it may be slightly lower given his HCTZ and also possibly from his drinking.  Can have follow-up BMP  outpatient to monitor to see if he needs daily repletion if adjustment of his diet is not adequate.  Amlodipine was added to his regimen as well as atorvastatin and aspirin for now.  Counseled on alcohol cessation and wants to work on cessation on his own and possible follow-up with [facility] as outpatient if needed.    Patient back to baseline with no acute neurological symptoms at time of discharge    Patient discharged in stable condition with close PCP and neurology follow up.     No future appointments.    Return precautions and follow up discussed and patient voiced agreement and understanding of treatment plan.     DISCHARGE Follow Up Recommendations for labs and diagnostics:   Follow-up 2D echo as outpatient, was only able to be completed prior to his discharge did not want to stay for the results and felt this was reasonable with outpatient follow-up for abnormalities  Follow-up with PCP to monitor blood pressure and renal function/electrolytes  Follow-up with neurology as outpatient    CODE STATUS:  Code Status and Medical Interventions:   Ordered at: 07/31/23 1252     Code Status (Patient has no pulse and is not breathing):    CPR (Attempt to Resuscitate)     Medical Interventions (Patient has pulse or is breathing):    Full Support     Release to patient:    Routine Release           Day of Discharge     Vital Signs:  Temp:  [97.6 øF (36.4 øC)-98.6 øF (37 øC)] 97.9 øF (36.6 øC)  Heart Rate:  [58-65] 58  Resp:  [17-18] 17  BP: (156-174)/() 168/94    Physical Exam  Gen: awake, resting in bed, conversant   HENT: NCAT, mmm no facial droop  Resp: CTAB, normal respiratory effort  CV: RRR, no LE pitting edema  GI: Abdomen soft, NT, ND, no guarding, +BS  Psych: appropriate mood and affect, aox3  Skin: warm, dry, no numbness/tingling strength symmetric throughout      Discharge Details        Discharge Medications        New Medications        Instructions Start Date   amLODIPine 10 MG  tablet  Commonly known as: NORVASC   10 mg, Oral, Daily      aspirin 81 MG EC tablet   81 mg, Oral, Daily      atorvastatin 40 MG tablet  Commonly known as: Lipitor   40 mg, Oral, Daily             Continue These Medications        Instructions Start Date   lisinopril-hydrochlorothiazide 20-12.5 MG per tablet  Commonly known as: PRINZIDE,ZESTORETIC   1 tablet, Oral, Daily                 Discharge Disposition:  Home or Self Care    Diet: patient counseled on dietary changes made during hospital and plans to  advance as tolerated     Discharge Activity: advance as tolerated      Additional Instructions for the Follow-ups that You Need to Schedule       Discharge Follow-up with PCP   As directed       Currently Documented PCP:    Provider, No Known    PCP Phone Number:    None     Follow Up Details: 3-5 days hospital f/u htn, hld, prediabetes        Discharge Follow-up with Specified Provider: 3-4 weeks neurology f/u   As directed      To: 3-4 weeks neurology f/u                Pertinent  and/or Most Recent Results       LAB RESULTS:      Lab 08/01/23 0443 07/31/23  0725   WBC 5.26 6.21   HEMOGLOBIN 14.1 14.7   HEMATOCRIT 43.4 42.4   PLATELETS 246 258   NEUTROS ABS  --  3.68   IMMATURE GRANS (ABS)  --  0.01   LYMPHS ABS  --  1.74   MONOS ABS  --  0.56   EOS ABS  --  0.14   MCV 92.9 86.9   PROTIME  --  13.0         Lab 08/01/23 0443 07/31/23  0725   SODIUM 140 137   POTASSIUM 4.0 3.1*   CHLORIDE 104 98   CO2 26.1 24.1   ANION GAP 9.9 14.9   BUN 12 10   CREATININE 0.76 0.69*   EGFR 115.1 118.5   GLUCOSE 127* 141*   CALCIUM 9.2 9.3   MAGNESIUM 2.0  --    HEMOGLOBIN A1C 6.30*  --          Lab 07/31/23  0725   TOTAL PROTEIN 7.7   ALBUMIN 4.9   GLOBULIN 2.8   ALT (SGPT) 38   AST (SGOT) 30   BILIRUBIN 0.2   ALK PHOS 56         Lab 08/01/23  0443 07/31/23  1324 07/31/23  0725   HSTROP T 7 7 8   PROTIME  --   --  13.0   INR  --   --  0.97         Lab 08/01/23 0443   CHOLESTEROL 261*   LDL CHOL 115*   HDL CHOL 48    TRIGLYCERIDES 555*         Lab 07/31/23  1324   ABO TYPING A   RH TYPING Positive   ANTIBODY SCREEN Negative         Brief Urine Lab Results       None          Microbiology Results (last 10 days)       ** No results found for the last 240 hours. **            RADIOLOGY:    CT Angiogram Neck    Result Date: 7/31/2023  Impression:   1. No evidence of large vessel occlusion, significant stenosis or aneurysm formation. 2. Mild stenosis noted in the proximal intracranial segment of the left vertebral artery and mild changes noted in the cranial portions of the internal carotid arteries bilaterally 3. Mild right hilar adenopathy likely reactive.  Consider 3 to six-month follow-up as clinically indicated     RADAMES OSORIO MD       Electronically Signed and Approved By: RADAMES OSORIO MD on 7/31/2023 at 9:07             MRI Brain With & Without Contrast    Result Date: 7/31/2023  Impression:   1. No acute ischemic change 2. Scattered white matter changes which are nonspecific but can be seen with small vessel ischemic disease, sequela of demyelinating disease, sequela migraine headaches, post inflammatory/infectious change, sequela vasculitis, among other etiologies.  Please correlate clinically. 3. No abnormal areas of postcontrast enhancement noted.      RADAMES OSORIO MD       Electronically Signed and Approved By: RADAMES OSORIO MD on 7/31/2023 at 10:15             XR Chest 1 View    Result Date: 7/31/2023  Impression:   1. No acute process       RADAMES OSORIO MD       Electronically Signed and Approved By: RADAMES OSORIO MD on 7/31/2023 at 9:33             CT Head Without Contrast Stroke Protocol    Result Date: 7/31/2023  Impression:   1. No acute intracranial abnormality   Findings were discussed with Dr. Lazo at 7:41a.mMimi OSORIO MD       Electronically Signed and Approved By: RADAMES OSORIO MD on 7/31/2023 at 7:46             CT Angiogram Head w AI Analysis of LVO    Result Date:  7/31/2023  Impression:   1. No evidence of large vessel occlusion, significant stenosis or aneurysm formation. 2. Mild stenosis noted in the proximal intracranial segment of the left vertebral artery and mild changes noted in the cranial portions of the internal carotid arteries bilaterally 3. Mild right hilar adenopathy likely reactive.  Consider 3 to six-month follow-up as clinically indicated     RADAMES OSORIO MD       Electronically Signed and Approved By: RADAMES OSORIO MD on 7/31/2023 at 9:07                          Labs Pending at Discharge:        Time spent on Discharge including face to face service: Greater than 35 minutes

## 2023-08-02 LAB — QT INTERVAL: 393 MS

## 2023-08-02 NOTE — PROGRESS NOTES
[facility]  [facility] Tele[facility]urology Consult Services    Routine Consult Follow-Up    Patient Name:   Nessa Brown  YOB: 1980  Identification Number:   MRN - [id number]  Date of Service:   08/01/2023 20:35:31    Diagnosis        G45.9 - Transient cerebral ischemic attack, unspecified    Impression  43 y/o with htn presents with blurry vision, htn, and left arm numbness all resolved. Question TIA vs. Hypertensive encephalopathy.    Treat BP.  MRI negative.  ASA 81 mg daily.  Atorvastatin    F/U outpatient Neurology in 3-4 weeks.    Ok to discharge. I will sign off.    Our recommendations are outlined below    Dispositions :  No further recommendations  Will signoff please contact for any questions  Outpatient Neurology follow up in 1-3 weeks    Subjective  Patient is clinically stable. All symptoms resolved.      Examination  BP(168/94), Pulse(94), Temp(37.6), Resp(18),  1A: Level of Consciousness - Alert; keenly responsive + 0  1B: Ask Month and Age - Both Questions Right + 0  1C: Blink Eyes & Squeeze Hands - Performs Both Tasks + 0  2: Test Horizontal Extraocular Movements - Normal + 0  3: Test Visual Fields - No Visual Loss + 0  4: Test Facial Palsy (Use Grimace if Obtunded) - Normal symmetry + 0  5A: Test Left Arm Motor Drift - No Drift for 10 Seconds + 0  5B: Test Right Arm Motor Drift - No Drift for 10 Seconds + 0  6A: Test Left Leg Motor Drift - No Drift for 5 Seconds + 0  6B: Test Right Leg Motor Drift - No Drift for 5 Seconds + 0  7: Test Limb Ataxia (FNF/Heel-Shin) - No Ataxia + 0  8: Test Sensation - Normal; No sensory loss + 0  9: Test Language/Aphasia - Normal; No aphasia + 0  10: Test Dysarthria - Normal + 0  11: Test Extinction/Inattention - No abnormality + 0    NIHSS Score: 0            Telehealth Neurology consultation was provided. I spent 20 minutes providing telehealth care. This includes time spent for face to face visit via telemedicine, review of medical  records, imaging studies and discussion of findings with providers, the patient and/or family.      Dr Woodrow Hayes      [facility]  For Inpatient follow-up with [facility] physician please call [facility] [phone]. This is not an outpatient service. Post hospital discharge, please contact hospital directly.

## 2023-11-29 ENCOUNTER — OFFICE VISIT (OUTPATIENT)
Dept: INTERNAL MEDICINE | Facility: CLINIC | Age: 43
End: 2023-11-29
Payer: MEDICAID

## 2023-11-29 VITALS
DIASTOLIC BLOOD PRESSURE: 108 MMHG | SYSTOLIC BLOOD PRESSURE: 172 MMHG | TEMPERATURE: 98.2 F | HEIGHT: 69 IN | WEIGHT: 194 LBS | HEART RATE: 69 BPM | BODY MASS INDEX: 28.73 KG/M2 | OXYGEN SATURATION: 98 %

## 2023-11-29 DIAGNOSIS — F10.91 ALCOHOL USE DISORDER IN REMISSION: ICD-10-CM

## 2023-11-29 DIAGNOSIS — F17.201 TOBACCO ABUSE, IN REMISSION: ICD-10-CM

## 2023-11-29 DIAGNOSIS — E78.00 PURE HYPERCHOLESTEROLEMIA: ICD-10-CM

## 2023-11-29 DIAGNOSIS — R73.03 PREDIABETES: ICD-10-CM

## 2023-11-29 DIAGNOSIS — E55.9 VITAMIN D DEFICIENCY: ICD-10-CM

## 2023-11-29 DIAGNOSIS — I10 PRIMARY HYPERTENSION: Primary | ICD-10-CM

## 2023-11-29 PROCEDURE — 99204 OFFICE O/P NEW MOD 45 MIN: CPT | Performed by: INTERNAL MEDICINE

## 2023-11-29 RX ORDER — ATORVASTATIN CALCIUM 40 MG/1
40 TABLET, FILM COATED ORAL DAILY
Qty: 90 TABLET | Refills: 1 | Status: SHIPPED | OUTPATIENT
Start: 2023-11-29

## 2023-11-29 RX ORDER — AMLODIPINE BESYLATE 5 MG/1
5 TABLET ORAL DAILY
Qty: 30 TABLET | Refills: 1 | Status: SHIPPED | OUTPATIENT
Start: 2023-11-29

## 2023-11-29 RX ORDER — LISINOPRIL AND HYDROCHLOROTHIAZIDE 20; 12.5 MG/1; MG/1
TABLET ORAL
Qty: 60 TABLET | Refills: 1 | Status: SHIPPED | OUTPATIENT
Start: 2023-11-29

## 2023-11-29 NOTE — ASSESSMENT & PLAN NOTE
Lipid abnormalities are uncontrolled elevated in August 2023-cholesterol equals 261 triglycerides 555 HDL 48  was on atorvastatin 40 mg 1 daily and needs a refill    Plan continue atorvastatin 40 mg 1 daily and recheck lipids

## 2023-11-29 NOTE — PATIENT INSTRUCTIONS
Increase zestoreti 20-12.5 2 daily  Add amlodipine 5 mg daily  Check BP 2x/d-4-5x/week  Cont atorvastatin  DM/HTN info given  F/u 3 weeks   Do labs  Pt given info by clinic staff-[facility], etc  Pt has applied for hospital's indigent program already  Needs flu shot

## 2023-11-29 NOTE — ASSESSMENT & PLAN NOTE
A1c equals 6.4 uncontrolled    Plan follow a low-carb diet-walk at least 30 minutes daily-lifestyle changes discussed-information on prediabetes/diabetes and eating plan given through [facility]  Check A1c.  Will hold off on medications at this time.

## 2023-11-29 NOTE — PROGRESS NOTES
Lab draw CHIEF COMPLAINT  Nessa Brown presents to [facility] to Establish Care, Hypertension, and when he eats he feels like his body gets hot      HPI  Pt here with -/friend-Mrs. Antonietta Urena-    43 yo [place]er speaking male here to establish care    Records reviewed, meds reviewed in detail, labs reviewed with patient.  Plan of care is as follows below.      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   Medical History: has a past medical history of Hypertension.   Surgical History: has a past surgical history that includes Throat surgery.   Family History: family history is not on file.   Social History: reports that he has never smoked. He has never used smokeless tobacco. He reports that he does not drink alcohol and does not use drugs.  Social History     Social History Narrative    Not on file         Current Outpatient Medications:     atorvastatin (Lipitor) 40 MG tablet, Take 1 tablet by mouth Daily., Disp: 90 tablet, Rfl: 1    lisinopril-hydrochlorothiazide (PRINZIDE,ZESTORETIC) 20-12.5 MG per tablet, 2 tabs po daily, Disp: 60 tablet, Rfl: 1    amLODIPine  "(NORVASC) 5 MG tablet, Take 1 tablet by mouth Daily., Disp: 30 tablet, Rfl: 1     OBJECTIVE  Vital Signs  Vitals:    11/29/23 0817 11/29/23 0844   BP: (!) 170/102 (!) 172/108   BP Location: Left arm Right arm   Patient Position: Sitting Sitting   Cuff Size: Adult Adult   Pulse: 69    Temp: 98.2 °F (36.8 °C)    TempSrc: Temporal    SpO2: 98%    Weight: 88 kg (194 lb)    Height: 175.3 cm (69\")       Body mass index is 28.65 kg/m².      Physical Exam  Vitals and nursing note reviewed.   Constitutional:       Appearance: Normal appearance.   HENT:      Head: Normocephalic and atraumatic.      Nose: Nose normal.   Eyes:      Extraocular Movements: Extraocular movements intact.      Pupils: Pupils are equal, round, and reactive to light.   Cardiovascular:      Rate and Rhythm: Normal rate and regular rhythm.   Pulmonary:      Effort: Pulmonary effort is normal.      Breath sounds: Normal breath sounds.   Abdominal:      General: Abdomen is flat.      Palpations: Abdomen is soft.   Musculoskeletal:      Cervical back: Normal range of motion and neck supple.   Skin:     General: Skin is warm and dry.   Neurological:      General: No focal deficit present.      Mental Status: He is alert and oriented to person, place, and time.   Psychiatric:         Mood and Affect: Mood normal.         Behavior: Behavior normal.         RESULTS REVIEW  Lab Results   Component Value Date    PROBNP 16.8 01/26/2022     CMP          12/6/2022    01:16 7/31/2023    07:25 8/1/2023    04:43   CMP   Glucose 108  141  127    BUN 8  10  12    Creatinine 0.61  0.69  0.76    EGFR 123.8  118.5  115.1    Sodium 139  137  140    Potassium 3.5  3.1  4.0    Chloride 97  98  104    Calcium 9.5  9.3  9.2    Total Protein 8.2  7.7     Albumin 5.20  4.9     Globulin 3.0  2.8     Total Bilirubin 0.2  0.2     Alkaline Phosphatase 62  56     AST (SGOT) 30  30     ALT (SGPT) 33  38     Albumin/Globulin Ratio 1.7  1.8     BUN/Creatinine Ratio 13.1  14.5  15.8  " "  Anion Gap 15.2  14.9  9.9      CBC w/diff          12/6/2022    01:16 7/31/2023    07:25 8/1/2023    04:43   CBC w/Diff   WBC 6.30  6.21  5.26    RBC 5.02  4.88  4.67    Hemoglobin 14.8  14.7  14.1    Hematocrit 41.7  42.4  43.4    MCV 83.1  86.9  92.9    MCH 29.5  30.1  30.2    MCHC 35.5  34.7  32.5    RDW 12.2  12.5  12.6    Platelets 247  258  246    Neutrophil Rel % 41.0  59.2     Immature Granulocyte Rel % 0.2  0.2     Lymphocyte Rel % 48.4  28.0     Monocyte Rel % 6.8  9.0     Eosinophil Rel % 2.5  2.3     Basophil Rel % 1.1  1.3        Lipid Panel          8/1/2023    04:43   Lipid Panel   Total Cholesterol 261    Triglycerides 555    HDL Cholesterol 48    VLDL Cholesterol 98    LDL Cholesterol  115    LDL/HDL Ratio 2.13       No results found for: \"TSH\"   No results found for: \"FREET4\"   A1C Last 3 Results          8/1/2023    04:43   HGBA1C Last 3 Results   Hemoglobin A1C 6.30          No Images in the past 120 days found..              ASSESSMENT & PLAN  Diagnoses and all orders for this visit:    1. Primary hypertension (Primary)  Assessment & Plan:  Hypertension is uncontrolled.  Has been getting headaches and even had an ER visit for left arm left leg numbness few months ago with negative MRI CTA of head and neck.   Patient's blood pressure at home has been ranging from 140-170/100 he states  Discussed hypertension and complications such as stroke MI peripheral vascular disease and kidney problems.    Plan-increase Zestoretic 2012.5 to 2 tablets daily, add amlodipine 5 times daily monitor for any lower extremity swelling.  Check blood pressure twice a day 4-5 times a week and record follow-up in 3 weeks to evaluate blood pressure  Do labs to check for lipids comp TSH B12 vitamin D CBC  Follow a low-salt diet    Orders:  -     lisinopril-hydrochlorothiazide (PRINZIDE,ZESTORETIC) 20-12.5 MG per tablet; 2 tabs po daily  Dispense: 60 tablet; Refill: 1  -     amLODIPine (NORVASC) 5 MG tablet; Take 1 tablet " by mouth Daily.  Dispense: 30 tablet; Refill: 1  -     Comprehensive Metabolic Panel; Future  -     Lipid Panel; Future  -     CBC & Differential; Future  -     Vitamin B12; Future  -     Folate; Future  -     Vitamin D,25-Hydroxy; Future  -     Magnesium; Future  -     Microalbumin / Creatinine Urine Ratio - Urine, Clean Catch; Future  -     Hemoglobin A1c; Future  -     TSH+Free T4; Future  -     T3, Free; Future    2. Prediabetes  Assessment & Plan:  A1c equals 6.4 uncontrolled    Plan follow a low-carb diet-walk at least 30 minutes daily-lifestyle changes discussed-information on prediabetes/diabetes and eating plan given through [employer]  Check A1c.  Will hold off on medications at this time.    Orders:  -     Comprehensive Metabolic Panel; Future  -     Lipid Panel; Future  -     CBC & Differential; Future  -     Vitamin B12; Future  -     Folate; Future  -     Vitamin D,25-Hydroxy; Future  -     Magnesium; Future  -     Microalbumin / Creatinine Urine Ratio - Urine, Clean Catch; Future  -     Hemoglobin A1c; Future  -     TSH+Free T4; Future  -     T3, Free; Future    3. Pure hypercholesterolemia  Assessment & Plan:  Lipid abnormalities are uncontrolled elevated in August 2023-cholesterol equals 261 triglycerides 555 HDL 48  was on atorvastatin 40 mg 1 daily and needs a refill    Plan continue atorvastatin 40 mg 1 daily and recheck lipids    Orders:  -     Comprehensive Metabolic Panel; Future  -     Lipid Panel; Future  -     CBC & Differential; Future  -     Vitamin B12; Future  -     Folate; Future  -     Vitamin D,25-Hydroxy; Future  -     Magnesium; Future  -     Microalbumin / Creatinine Urine Ratio - Urine, Clean Catch; Future  -     Hemoglobin A1c; Future  -     TSH+Free T4; Future  -     T3, Free; Future  -     atorvastatin (Lipitor) 40 MG tablet; Take 1 tablet by mouth Daily.  Dispense: 90 tablet; Refill: 1    4. Vitamin D deficiency  -     Comprehensive Metabolic Panel; Future  -     Lipid  Panel; Future  -     CBC & Differential; Future  -     Vitamin B12; Future  -     Folate; Future  -     Vitamin D,25-Hydroxy; Future  -     Magnesium; Future  -     Microalbumin / Creatinine Urine Ratio - Urine, Clean Catch; Future  -     Hemoglobin A1c; Future  -     TSH+Free T4; Future  -     T3, Free; Future    5. Alcohol use disorder in remission  Assessment & Plan:  Used to drink a lot-3 yrs ago-every day-12 beers daily for 12 yrs      6. Tobacco abuse, in remission  Assessment & Plan:  used to smoke cigs 12 yrs ago -10 cigs daily for  20 yrs             BMI is >= 25 and <30. (Overweight) The following options were offered after discussion;: weight loss educational material (shared in after visit summary), exercise counseling/recommendations, nutrition counseling/recommendations, and pharmacological intervention options       Patient Instructions   Increase zestoreti 20-12.5 2 daily  Add amlodipine 5 mg daily  Check BP 2x/d-4-5x/week  Cont atorvastatin  DM/HTN info given  F/u 3 weeks   Do labs  Pt given info by clinic staff-[facility], etc  Pt has applied for hospital's indigent program already  Needs flu shot       FOLLOW UP  Return in about 3 weeks (around 12/20/2023) for Recheck.    Patient was given instructions and counseling regarding his condition or for health maintenance advice. Please see specific information pulled into the AVS if appropriate.

## 2023-11-29 NOTE — ASSESSMENT & PLAN NOTE
Hypertension is uncontrolled.  Has been getting headaches and even had an ER visit for left arm left leg numbness few months ago with negative MRI CTA of head and neck.   Patient's blood pressure at home has been ranging from 140-170/100 he states  Discussed hypertension and complications such as stroke MI peripheral vascular disease and kidney problems.    Plan-increase Zestoretic 2012.5 to 2 tablets daily, add amlodipine 5 times daily monitor for any lower extremity swelling.  Check blood pressure twice a day 4-5 times a week and record follow-up in 3 weeks to evaluate blood pressure  Do labs to check for lipids comp TSH B12 vitamin D CBC  Follow a low-salt diet

## 2023-12-29 ENCOUNTER — LAB (OUTPATIENT)
Dept: LAB | Facility: HOSPITAL | Age: 43
End: 2023-12-29
Payer: MEDICAID

## 2023-12-29 DIAGNOSIS — I10 PRIMARY HYPERTENSION: ICD-10-CM

## 2023-12-29 DIAGNOSIS — R73.03 PREDIABETES: ICD-10-CM

## 2023-12-29 DIAGNOSIS — E78.00 PURE HYPERCHOLESTEROLEMIA: ICD-10-CM

## 2023-12-29 DIAGNOSIS — E55.9 VITAMIN D DEFICIENCY: ICD-10-CM

## 2023-12-29 LAB
25(OH)D3 SERPL-MCNC: 16.9 NG/ML (ref 30–100)
ALBUMIN SERPL-MCNC: 4.6 G/DL (ref 3.5–5.2)
ALBUMIN UR-MCNC: <1.2 MG/DL
ALBUMIN/GLOB SERPL: 1.6 G/DL
ALP SERPL-CCNC: 64 U/L (ref 39–117)
ALT SERPL W P-5'-P-CCNC: 51 U/L (ref 1–41)
ANION GAP SERPL CALCULATED.3IONS-SCNC: 12.9 MMOL/L (ref 5–15)
AST SERPL-CCNC: 27 U/L (ref 1–40)
BASOPHILS # BLD AUTO: 0.03 10*3/MM3 (ref 0–0.2)
BASOPHILS NFR BLD AUTO: 0.7 % (ref 0–1.5)
BILIRUB SERPL-MCNC: 0.3 MG/DL (ref 0–1.2)
BUN SERPL-MCNC: 13 MG/DL (ref 6–20)
BUN/CREAT SERPL: 18.8 (ref 7–25)
CALCIUM SPEC-SCNC: 9.5 MG/DL (ref 8.6–10.5)
CHLORIDE SERPL-SCNC: 104 MMOL/L (ref 98–107)
CHOLEST SERPL-MCNC: 137 MG/DL (ref 0–200)
CO2 SERPL-SCNC: 25.1 MMOL/L (ref 22–29)
CREAT SERPL-MCNC: 0.69 MG/DL (ref 0.76–1.27)
CREAT UR-MCNC: 126.8 MG/DL
DEPRECATED RDW RBC AUTO: 39.8 FL (ref 37–54)
EGFRCR SERPLBLD CKD-EPI 2021: 117.8 ML/MIN/1.73
EOSINOPHIL # BLD AUTO: 0.12 10*3/MM3 (ref 0–0.4)
EOSINOPHIL NFR BLD AUTO: 2.7 % (ref 0.3–6.2)
ERYTHROCYTE [DISTWIDTH] IN BLOOD BY AUTOMATED COUNT: 13 % (ref 12.3–15.4)
FOLATE SERPL-MCNC: 16.8 NG/ML (ref 4.78–24.2)
GLOBULIN UR ELPH-MCNC: 2.8 GM/DL
GLUCOSE SERPL-MCNC: 76 MG/DL (ref 65–99)
HBA1C MFR BLD: 6.5 % (ref 4.8–5.6)
HCT VFR BLD AUTO: 41.3 % (ref 37.5–51)
HDLC SERPL-MCNC: 49 MG/DL (ref 40–60)
HGB BLD-MCNC: 13.9 G/DL (ref 13–17.7)
IMM GRANULOCYTES # BLD AUTO: 0.01 10*3/MM3 (ref 0–0.05)
IMM GRANULOCYTES NFR BLD AUTO: 0.2 % (ref 0–0.5)
LDLC SERPL CALC-MCNC: 69 MG/DL (ref 0–100)
LDLC/HDLC SERPL: 1.38 {RATIO}
LYMPHOCYTES # BLD AUTO: 1.58 10*3/MM3 (ref 0.7–3.1)
LYMPHOCYTES NFR BLD AUTO: 35.1 % (ref 19.6–45.3)
MAGNESIUM SERPL-MCNC: 2.1 MG/DL (ref 1.6–2.6)
MCH RBC QN AUTO: 28.4 PG (ref 26.6–33)
MCHC RBC AUTO-ENTMCNC: 33.7 G/DL (ref 31.5–35.7)
MCV RBC AUTO: 84.5 FL (ref 79–97)
MICROALBUMIN/CREAT UR: NORMAL MG/G{CREAT}
MONOCYTES # BLD AUTO: 0.25 10*3/MM3 (ref 0.1–0.9)
MONOCYTES NFR BLD AUTO: 5.6 % (ref 5–12)
NEUTROPHILS NFR BLD AUTO: 2.51 10*3/MM3 (ref 1.7–7)
NEUTROPHILS NFR BLD AUTO: 55.7 % (ref 42.7–76)
NRBC BLD AUTO-RTO: 0 /100 WBC (ref 0–0.2)
PLATELET # BLD AUTO: 283 10*3/MM3 (ref 140–450)
PMV BLD AUTO: 10.7 FL (ref 6–12)
POTASSIUM SERPL-SCNC: 3.8 MMOL/L (ref 3.5–5.2)
PROT SERPL-MCNC: 7.4 G/DL (ref 6–8.5)
RBC # BLD AUTO: 4.89 10*6/MM3 (ref 4.14–5.8)
SODIUM SERPL-SCNC: 142 MMOL/L (ref 136–145)
T3FREE SERPL-MCNC: 3.27 PG/ML (ref 2–4.4)
T4 FREE SERPL-MCNC: 1.05 NG/DL (ref 0.93–1.7)
TRIGL SERPL-MCNC: 101 MG/DL (ref 0–150)
TSH SERPL DL<=0.05 MIU/L-ACNC: 1.28 UIU/ML (ref 0.27–4.2)
VIT B12 BLD-MCNC: 826 PG/ML (ref 211–946)
VLDLC SERPL-MCNC: 19 MG/DL (ref 5–40)
WBC NRBC COR # BLD AUTO: 4.5 10*3/MM3 (ref 3.4–10.8)

## 2023-12-29 PROCEDURE — 82043 UR ALBUMIN QUANTITATIVE: CPT

## 2023-12-29 PROCEDURE — 36415 COLL VENOUS BLD VENIPUNCTURE: CPT

## 2023-12-29 PROCEDURE — 82746 ASSAY OF FOLIC ACID SERUM: CPT

## 2023-12-29 PROCEDURE — 80053 COMPREHEN METABOLIC PANEL: CPT

## 2023-12-29 PROCEDURE — 82306 VITAMIN D 25 HYDROXY: CPT

## 2023-12-29 PROCEDURE — 82607 VITAMIN B-12: CPT

## 2023-12-29 PROCEDURE — 84443 ASSAY THYROID STIM HORMONE: CPT

## 2023-12-29 PROCEDURE — 84439 ASSAY OF FREE THYROXINE: CPT

## 2023-12-29 PROCEDURE — 83735 ASSAY OF MAGNESIUM: CPT

## 2023-12-29 PROCEDURE — 84481 FREE ASSAY (FT-3): CPT

## 2023-12-29 PROCEDURE — 85025 COMPLETE CBC W/AUTO DIFF WBC: CPT

## 2023-12-29 PROCEDURE — 80061 LIPID PANEL: CPT

## 2023-12-29 PROCEDURE — 83036 HEMOGLOBIN GLYCOSYLATED A1C: CPT

## 2023-12-29 PROCEDURE — 82570 ASSAY OF URINE CREATININE: CPT

## 2024-01-05 PROBLEM — R73.03 PREDIABETES: Status: RESOLVED | Noted: 2023-11-29 | Resolved: 2024-01-05

## 2024-01-05 NOTE — PROGRESS NOTES
CHIEF COMPLAINT  Nessa Brown presents to [facility] for follow-up of Hypertension and Diabetes.    HPI  Patient was given information for community clinic however patient does not qualify for assistance through them.  Also does not have an any health insurance through his work which is not provided.  Discussed importance of trying to get health insurance as well as Colin Simeon to help.    43-year-old patient here for  follow-up uncontrolled blood pressure and diabetes    Diabetes-new diagnosis-A1c equals 6.5 December 29, 2023-no urine for protein seen-needs meds    Cholesterol-much improved with atorvastatin.  LDL equals 69 HDL 49 cholesterol 137    Vitamin D deficiency uncontrolled vitamin D equals 16.9    Patient Instructions 11/29/23   Increase zestoretic 20-12.5 2 daily  Add amlodipine 5 mg daily  Check BP 2x/d-4-5x/week  Cont atorvastatin  DM/HTN info given  F/u 3 weeks   Do labs  Pt given info by clinic staff-[facility], etc  Pt has applied for hospital's indigent program already  Needs flu shot    November 29, 2023 visit  Pt here with -/friend-Mrs. Antonietta Urena-     43 yo Niuean speaking male here to establish care     Records reviewed, meds reviewed in detail, labs reviewed with patient.  Plan of care is as follows below.        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 -neg bx 3 yrs ago by Dr Greenberg,used to smoke cigs 12 yrs ago -10 cigs daily for  20 yrs     SH-1-2 beers on weekends-  Used to  "drink a lot-3 yrs ago-every day-12 beers daily for 12 yrs-works as cook at [employer], 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       Current Outpatient Medications:     amLODIPine (NORVASC) 5 MG tablet, Take 1 tablet by mouth Daily., Disp: 90 tablet, Rfl: 1    atorvastatin (Lipitor) 40 MG tablet, Take 1 tablet by mouth Daily., Disp: 90 tablet, Rfl: 1    lisinopril-hydrochlorothiazide (PRINZIDE,ZESTORETIC) 20-12.5 MG per tablet, 2 tabs po daily, Disp: 180 tablet, Rfl: 1    glucose blood test strip, Check BS BID or prn, Disp: 100 each, Rfl: 3    glucose monitor monitoring kit, Use 1 each As Needed (check BS BID or prn)., Disp: 1 each, Rfl: 0    Lancets misc, Check BS q d  or prn, Disp: 100 each, Rfl: 3    metFORMIN ER (GLUCOPHAGE-XR) 500 MG 24 hr tablet, Take 1 tablet by mouth Daily With Breakfast., Disp: 90 tablet, Rfl: 1   PFSH reviewed.      OBJECTIVE  Vital Signs  Vitals:    01/08/24 0943   BP: 134/81   BP Location: Left arm   Patient Position: Sitting   Cuff Size: Adult   Pulse: 60   Temp: 99.1 °F (37.3 °C)   TempSrc: Infrared   SpO2: 100%   Weight: 86 kg (189 lb 9.6 oz)   Height: 175.3 cm (69.02\")      Body mass index is 27.99 kg/m².  KGT=058-532 lbs--    Physical Exam  Vitals and nursing note reviewed.   Constitutional:       Appearance: Normal appearance.   HENT:      Head: Normocephalic and atraumatic.      Nose: Nose normal.   Eyes:      Extraocular Movements: Extraocular movements intact.      Pupils: Pupils are equal, round, and reactive to light.   Cardiovascular:      Rate and Rhythm: Normal rate and regular rhythm.   Pulmonary:      Effort: Pulmonary effort is normal.      Breath sounds: Normal breath sounds.   Abdominal:      General: Abdomen is flat.      Palpations: Abdomen is soft.   Musculoskeletal:      Cervical back: Normal range of motion and neck supple.   Skin:     General: Skin is warm and dry.   Neurological:      General: No focal " deficit present.      Mental Status: He is alert and oriented to person, place, and time.   Psychiatric:         Mood and Affect: Mood normal.         Behavior: Behavior normal.          RESULTS REVIEW  Lab Results   Component Value Date    PROBNP 16.8 01/26/2022     CMP          7/31/2023    07:25 8/1/2023    04:43 12/29/2023    11:30   CMP   Glucose 141  127  76    BUN 10  12  13    Creatinine 0.69  0.76  0.69    EGFR 118.5  115.1  117.8    Sodium 137  140  142    Potassium 3.1  4.0  3.8    Chloride 98  104  104    Calcium 9.3  9.2  9.5    Total Protein 7.7   7.4    Albumin 4.9   4.6    Globulin 2.8   2.8    Total Bilirubin 0.2   0.3    Alkaline Phosphatase 56   64    AST (SGOT) 30   27    ALT (SGPT) 38   51    Albumin/Globulin Ratio 1.8   1.6    BUN/Creatinine Ratio 14.5  15.8  18.8    Anion Gap 14.9  9.9  12.9      CBC w/diff          7/31/2023    07:25 8/1/2023    04:43 12/29/2023    11:30   CBC w/Diff   WBC 6.21  5.26  4.50    RBC 4.88  4.67  4.89    Hemoglobin 14.7  14.1  13.9    Hematocrit 42.4  43.4  41.3    MCV 86.9  92.9  84.5    MCH 30.1  30.2  28.4    MCHC 34.7  32.5  33.7    RDW 12.5  12.6  13.0    Platelets 258  246  283    Neutrophil Rel % 59.2   55.7    Immature Granulocyte Rel % 0.2   0.2    Lymphocyte Rel % 28.0   35.1    Monocyte Rel % 9.0   5.6    Eosinophil Rel % 2.3   2.7    Basophil Rel % 1.3   0.7       Lipid Panel          8/1/2023    04:43 12/29/2023    11:30   Lipid Panel   Total Cholesterol 261  137    Triglycerides 555  101    HDL Cholesterol 48  49    VLDL Cholesterol 98  19    LDL Cholesterol  115  69    LDL/HDL Ratio 2.13  1.38       Lab Results   Component Value Date    TSH 1.280 12/29/2023      Lab Results   Component Value Date    FREET4 1.05 12/29/2023      A1C Last 3 Results          8/1/2023    04:43 12/29/2023    11:30   HGBA1C Last 3 Results   Hemoglobin A1C 6.30  6.50       Lab Results   Component Value Date    YGFOVWSR29 826 12/29/2023    SFHK28VY 16.9 (L) 12/29/2023    MG  2.1 12/29/2023        No Images in the past 120 days found..             ASSESSMENT & PLAN  Diagnoses and all orders for this visit:    1. Type 2 diabetes mellitus with hyperglycemia, without long-term current use of insulin (Primary)  Comments:  start metformin    mg one daily- monitor for diarrhea-low carb diet and exercise-lifestyle changes  Orders:  -     Lancets misc; Check BS q d  or prn  Dispense: 100 each; Refill: 3  -     glucose monitor monitoring kit; Use 1 each As Needed (check BS BID or prn).  Dispense: 1 each; Refill: 0  -     glucose blood test strip; Check BS BID or prn  Dispense: 100 each; Refill: 3  -     Hemoglobin A1c; Future  -     Basic Metabolic Panel; Future  -     MicroAlbumin, Urine, Random - Urine, Clean Catch; Future    2. Pure hypercholesterolemia  Assessment & Plan:  Lipid abnormalities arebetter controlled-LDL equals 69 HDL 49 cholesterol 137      Plan continue atorvastatin 40 mg 1 daily and recheck lipids      3. Primary hypertension  Assessment & Plan:  Hypertension is better-    Plan-Cont Zestoretic 2012.5 to 2 tablets daily and amlodipine 5 times daily No lower extremity swelling.  Check blood pressure twice a day 4-5 times a week and record   Follow a low-salt diet  Referred to social service/Colin Simeon to help with meds    Orders:  -     Discontinue: amLODIPine (NORVASC) 5 MG tablet; Take 1 tablet by mouth Daily.  Dispense: 30 tablet; Refill: 1  -     amLODIPine (NORVASC) 5 MG tablet; Take 1 tablet by mouth Daily.  Dispense: 90 tablet; Refill: 1  -     lisinopril-hydrochlorothiazide (PRINZIDE,ZESTORETIC) 20-12.5 MG per tablet; 2 tabs po daily  Dispense: 180 tablet; Refill: 1    4. Vitamin D deficiency  Comments:  start vit D 5000 units  MWF and Sat -recheck in 6 months  Orders:  -     Vitamin D,25-Hydroxy; Future    5. Tobacco abuse, in remission  Assessment & Plan:  used to smoke cigs 12 yrs ago -10 cigs daily for  20 yrs-none for months        6. Alcohol use disorder in  remission  Assessment & Plan:  Used to drink a lot-3 yrs ago-every day-12 beers daily for 12 yrs--none  for awhile      Other orders  -     metFORMIN ER (GLUCOPHAGE-XR) 500 MG 24 hr tablet; Take 1 tablet by mouth Daily With Breakfast.  Dispense: 90 tablet; Refill: 1                 Patient Instructions   Cont all meds  Walk 30 min   Daily  Refer to Colin Simeon to help with meds-gave him patient's  Mrs. Michael Urena [id number]- to help with his medications.  Cotn to monitor BP -goal <130/80  Check BS 3-4x/week-will see if can get samples from [facility]-  Start vit D 5000 units Mon Wed Friday and Saturday -  Start metformin  mg 1 daily  F/u 4-6 months     FOLLOW UP  Return in about 6 months (around 7/8/2024), or if symptoms worsen or fail to improve, for Recheck.    Patient was given instructions and counseling regarding his condition or for health maintenance advice. Please see specific information pulled into the AVS if appropriate.

## 2024-01-08 ENCOUNTER — PATIENT OUTREACH (OUTPATIENT)
Dept: CASE MANAGEMENT | Facility: OTHER | Age: 44
End: 2024-01-08
Payer: MEDICAID

## 2024-01-08 ENCOUNTER — OFFICE VISIT (OUTPATIENT)
Dept: INTERNAL MEDICINE | Facility: CLINIC | Age: 44
End: 2024-01-08
Payer: MEDICAID

## 2024-01-08 VITALS
BODY MASS INDEX: 28.08 KG/M2 | WEIGHT: 189.6 LBS | HEART RATE: 60 BPM | OXYGEN SATURATION: 100 % | HEIGHT: 69 IN | DIASTOLIC BLOOD PRESSURE: 81 MMHG | TEMPERATURE: 99.1 F | SYSTOLIC BLOOD PRESSURE: 134 MMHG

## 2024-01-08 DIAGNOSIS — F17.201 TOBACCO ABUSE, IN REMISSION: ICD-10-CM

## 2024-01-08 DIAGNOSIS — E55.9 VITAMIN D DEFICIENCY: ICD-10-CM

## 2024-01-08 DIAGNOSIS — E78.00 PURE HYPERCHOLESTEROLEMIA: ICD-10-CM

## 2024-01-08 DIAGNOSIS — F10.91 ALCOHOL USE DISORDER IN REMISSION: ICD-10-CM

## 2024-01-08 DIAGNOSIS — E11.65 TYPE 2 DIABETES MELLITUS WITH HYPERGLYCEMIA, WITHOUT LONG-TERM CURRENT USE OF INSULIN: Primary | ICD-10-CM

## 2024-01-08 DIAGNOSIS — I10 PRIMARY HYPERTENSION: Primary | ICD-10-CM

## 2024-01-08 DIAGNOSIS — I10 PRIMARY HYPERTENSION: ICD-10-CM

## 2024-01-08 PROBLEM — I16.9 HYPERTENSIVE CRISIS: Status: RESOLVED | Noted: 2023-07-31 | Resolved: 2024-01-08

## 2024-01-08 PROCEDURE — 99213 OFFICE O/P EST LOW 20 MIN: CPT | Performed by: INTERNAL MEDICINE

## 2024-01-08 RX ORDER — LISINOPRIL AND HYDROCHLOROTHIAZIDE 20; 12.5 MG/1; MG/1
TABLET ORAL
Qty: 180 TABLET | Refills: 1 | Status: SHIPPED | OUTPATIENT
Start: 2024-01-08

## 2024-01-08 RX ORDER — AMLODIPINE BESYLATE 5 MG/1
5 TABLET ORAL DAILY
Qty: 30 TABLET | Refills: 1 | Status: SHIPPED | OUTPATIENT
Start: 2024-01-08 | End: 2024-01-08 | Stop reason: SDUPTHER

## 2024-01-08 RX ORDER — LANCETS 30 GAUGE
EACH MISCELLANEOUS
Qty: 100 EACH | Refills: 3 | Status: SHIPPED | OUTPATIENT
Start: 2024-01-08

## 2024-01-08 RX ORDER — METFORMIN HYDROCHLORIDE 500 MG/1
500 TABLET, EXTENDED RELEASE ORAL
Qty: 90 TABLET | Refills: 1 | Status: SHIPPED | OUTPATIENT
Start: 2024-01-08

## 2024-01-08 RX ORDER — AMLODIPINE BESYLATE 5 MG/1
5 TABLET ORAL DAILY
Qty: 90 TABLET | Refills: 1 | Status: SHIPPED | OUTPATIENT
Start: 2024-01-08

## 2024-01-08 RX ORDER — BLOOD-GLUCOSE METER
1 KIT MISCELLANEOUS AS NEEDED
Qty: 1 EACH | Refills: 0 | Status: SHIPPED | OUTPATIENT
Start: 2024-01-08

## 2024-01-08 NOTE — ASSESSMENT & PLAN NOTE
Lipid abnormalities arebetter controlled-LDL equals 69 HDL 49 cholesterol 137      Plan continue atorvastatin 40 mg 1 daily and recheck lipids

## 2024-01-08 NOTE — PATIENT INSTRUCTIONS
Cont all meds  Walk 30 min   Daily  Refer to Colin Simeon to help with meds-gave him patient's  Mrs. Michael Urena [phone] to help with his medications.  Cotn to monitor BP -goal <130/80  Check BS 3-4x/week-will see if can get samples from [facility]-  Start vit D 5000 units Mon Wed Friday and Saturday -  Start metformin  mg 1 daily  F/u 4-6 months

## 2024-01-08 NOTE — OUTREACH NOTE
AMBULATORY CASE MANAGEMENT NOTE    Name and Relationship of Patient/Support Person:  -     Care Coordination      PCP reached out to CM and asked for assistance. Patient currently has no medical coverage and is self pay. Patient will need assistance with med affordability . (Zestoretic, amlodipine and atorvastatin before the end of this month ) Patient was [facility] into [facility] ( short term ) so that the patient may be referred to MSW for help with any potential INS providers that may be able to assist. PCP aware of the patient situation. Patient requires an  . His  is -Mrs Antonietta Zuluaga leat-[phone]- he gave permission for her to have access to his records etc     MSW referral sent     Education Documentation  No documentation found.        JEMIMA SHIN  Ambulatory Case Management    1/8/2024, 10:54 EST

## 2024-01-08 NOTE — ASSESSMENT & PLAN NOTE
Hypertension is better-    Plan-Cont Zestoretic 2012.5 to 2 tablets daily and amlodipine 5 times daily No lower extremity swelling.  Check blood pressure twice a day 4-5 times a week and record   Follow a low-salt diet  Referred to social service/Colin Simeon to help with meds

## 2024-01-09 ENCOUNTER — PATIENT OUTREACH (OUTPATIENT)
Dept: CASE MANAGEMENT | Facility: OTHER | Age: 44
End: 2024-01-09
Payer: MEDICAID

## 2024-01-09 NOTE — OUTREACH NOTE
Care Coordination    MSW received new referral from Colin regarding medication/no insurance. MSW left  with  and awaiting call back.    Kareen TONG -   Ambulatory Case Management    1/9/2024, 15:24 EST

## 2024-01-11 ENCOUNTER — PATIENT OUTREACH (OUTPATIENT)
Dept: CASE MANAGEMENT | Facility: OTHER | Age: 44
End: 2024-01-11
Payer: MEDICAID

## 2024-01-11 NOTE — OUTREACH NOTE
Social Work Assessment  Questions/Answers      Flowsheet Row Most Recent Value   Referral Source physician, nursing   Reason for Consult community resources, medication concerns   Preferred Language English   People in Home significant other   Current Living Arrangements home   Primary Care Provided by self   Source of Income salary/wages   Financial/Environmental Concerns unable to afford medication(s)   Application for Public Assistance obtained public assistance pending number          SDOH updated and reviewed with the patient during this program:  --     Disabilities: Not At Risk (7/31/2023)    Disabilities     Concentrating, Remembering, or Making Decisions Difficulty: no     Doing Errands Independently Difficulty: no      --     Employment: Not At Risk (1/11/2024)    Employment     Do you want help finding or keeping work or a job?: I do not need or want help      Financial Resource Strain: Low Risk  (8/1/2023)    Overall Financial Resource Strain (CARDIA)     Difficulty of Paying Living Expenses: Not very hard      --     Food Insecurity: No Food Insecurity (8/1/2023)    Hunger Vital Sign     Worried About Running Out of Food in the Last Year: Never true     Ran Out of Food in the Last Year: Never true      --     Housing Stability: Not At Risk (1/11/2024)    Housing Stability     Current Living Arrangements: home     Potentially Unsafe Housing Conditions: none      --     Transportation Needs: No Transportation Needs (1/11/2024)    PRAPARE - Transportation     Lack of Transportation (Medical): No     Lack of Transportation (Non-Medical): No      --     Utilities: Not At Risk (1/11/2024)    [facility] Utilities     Threatened with loss of utilities: No     Patient Outreach    MSW received call back from Antonietta Zuluaga (friend/intrepreter) regarding patient's medication needs. Ms. Zuluaga states that [facility] did not qualify for [facility] due to his income. Ms. Zuluaga was given resources for Good  Rx, RX Outreach Program, and [facility] new low [facility] pharmacy program. Ms. Margareth thankful for information and will assist patient and relay information.     Care Coordination    MSW has attempted x3 to reach patient's friend/ to discuss resources available. MSW left voicemails with no call back. MSW noted that patient was given [facility] information during PCP appt for assistance. MSW to assist if call back.   Kareen TONG -   Ambulatory Case Management    1/11/2024, 09:35 EST

## 2024-01-30 DIAGNOSIS — I10 PRIMARY HYPERTENSION: ICD-10-CM

## 2024-01-30 RX ORDER — LISINOPRIL AND HYDROCHLOROTHIAZIDE 20; 12.5 MG/1; MG/1
TABLET ORAL
Qty: 60 TABLET | Refills: 5 | Status: SHIPPED | OUTPATIENT
Start: 2024-01-30

## 2024-01-30 RX ORDER — AMLODIPINE BESYLATE 5 MG/1
5 TABLET ORAL DAILY
Qty: 30 TABLET | Refills: 5 | Status: SHIPPED | OUTPATIENT
Start: 2024-01-30

## 2024-06-07 DIAGNOSIS — E78.00 PURE HYPERCHOLESTEROLEMIA: ICD-10-CM

## 2024-06-07 RX ORDER — ATORVASTATIN CALCIUM 40 MG/1
40 TABLET, FILM COATED ORAL DAILY
Qty: 90 TABLET | Refills: 1 | Status: SHIPPED | OUTPATIENT
Start: 2024-06-07

## 2024-08-14 PROBLEM — E11.65 TYPE 2 DIABETES MELLITUS WITH HYPERGLYCEMIA, WITHOUT LONG-TERM CURRENT USE OF INSULIN: Status: ACTIVE | Noted: 2024-08-14

## 2024-08-23 ENCOUNTER — LAB (OUTPATIENT)
Dept: INTERNAL MEDICINE | Age: 44
End: 2024-08-23

## 2024-08-23 DIAGNOSIS — E55.9 VITAMIN D DEFICIENCY: ICD-10-CM

## 2024-08-23 DIAGNOSIS — E11.65 TYPE 2 DIABETES MELLITUS WITH HYPERGLYCEMIA, WITHOUT LONG-TERM CURRENT USE OF INSULIN: ICD-10-CM

## 2024-08-23 LAB
25(OH)D3 SERPL-MCNC: 45.2 NG/ML (ref 30–100)
ANION GAP SERPL CALCULATED.3IONS-SCNC: 13.3 MMOL/L (ref 5–15)
BUN SERPL-MCNC: 11 MG/DL (ref 6–20)
BUN/CREAT SERPL: 16.2 (ref 7–25)
CALCIUM SPEC-SCNC: 9.3 MG/DL (ref 8.6–10.5)
CHLORIDE SERPL-SCNC: 101 MMOL/L (ref 98–107)
CO2 SERPL-SCNC: 25.7 MMOL/L (ref 22–29)
CREAT SERPL-MCNC: 0.68 MG/DL (ref 0.76–1.27)
EGFRCR SERPLBLD CKD-EPI 2021: 118.3 ML/MIN/1.73
GLUCOSE SERPL-MCNC: 122 MG/DL (ref 65–99)
HBA1C MFR BLD: 6.3 % (ref 4.8–5.6)
POTASSIUM SERPL-SCNC: 4.1 MMOL/L (ref 3.5–5.2)
SODIUM SERPL-SCNC: 140 MMOL/L (ref 136–145)

## 2024-08-23 PROCEDURE — 82306 VITAMIN D 25 HYDROXY: CPT | Performed by: INTERNAL MEDICINE

## 2024-08-23 PROCEDURE — 83036 HEMOGLOBIN GLYCOSYLATED A1C: CPT | Performed by: INTERNAL MEDICINE

## 2024-08-23 PROCEDURE — 36415 COLL VENOUS BLD VENIPUNCTURE: CPT | Performed by: INTERNAL MEDICINE

## 2024-08-23 PROCEDURE — 80048 BASIC METABOLIC PNL TOTAL CA: CPT | Performed by: INTERNAL MEDICINE

## 2024-08-26 ENCOUNTER — OFFICE VISIT (OUTPATIENT)
Dept: INTERNAL MEDICINE | Age: 44
End: 2024-08-26
Payer: MEDICAID

## 2024-08-26 VITALS
HEIGHT: 69 IN | WEIGHT: 190.8 LBS | OXYGEN SATURATION: 96 % | DIASTOLIC BLOOD PRESSURE: 95 MMHG | SYSTOLIC BLOOD PRESSURE: 142 MMHG | TEMPERATURE: 96.4 F | BODY MASS INDEX: 28.26 KG/M2 | HEART RATE: 86 BPM

## 2024-08-26 DIAGNOSIS — E55.9 VITAMIN D DEFICIENCY: ICD-10-CM

## 2024-08-26 DIAGNOSIS — I10 PRIMARY HYPERTENSION: ICD-10-CM

## 2024-08-26 DIAGNOSIS — E11.65 TYPE 2 DIABETES MELLITUS WITH HYPERGLYCEMIA, WITHOUT LONG-TERM CURRENT USE OF INSULIN: Primary | ICD-10-CM

## 2024-08-26 DIAGNOSIS — R05.9 COUGH, UNSPECIFIED TYPE: ICD-10-CM

## 2024-08-26 DIAGNOSIS — E78.00 PURE HYPERCHOLESTEROLEMIA: ICD-10-CM

## 2024-08-26 LAB
ALBUMIN UR-MCNC: <1.2 MG/DL
CREAT UR-MCNC: 69.6 MG/DL
EXPIRATION DATE: NORMAL
FLUAV AG UPPER RESP QL IA.RAPID: NOT DETECTED
FLUBV AG UPPER RESP QL IA.RAPID: NOT DETECTED
INTERNAL CONTROL: NORMAL
Lab: NORMAL
MICROALBUMIN/CREAT UR: NORMAL MG/G{CREAT}
SARS-COV-2 AG UPPER RESP QL IA.RAPID: NOT DETECTED

## 2024-08-26 PROCEDURE — 87428 SARSCOV & INF VIR A&B AG IA: CPT | Performed by: INTERNAL MEDICINE

## 2024-08-26 PROCEDURE — 82043 UR ALBUMIN QUANTITATIVE: CPT | Performed by: INTERNAL MEDICINE

## 2024-08-26 PROCEDURE — 99214 OFFICE O/P EST MOD 30 MIN: CPT | Performed by: INTERNAL MEDICINE

## 2024-08-26 PROCEDURE — 82570 ASSAY OF URINE CREATININE: CPT | Performed by: INTERNAL MEDICINE

## 2024-08-26 RX ORDER — AMLODIPINE BESYLATE 5 MG/1
5 TABLET ORAL DAILY
Qty: 90 TABLET | Refills: 1 | Status: SHIPPED | OUTPATIENT
Start: 2024-08-26

## 2024-08-26 RX ORDER — METFORMIN HCL 500 MG
500 TABLET, EXTENDED RELEASE 24 HR ORAL
Qty: 180 TABLET | Refills: 1 | Status: SHIPPED | OUTPATIENT
Start: 2024-08-26 | End: 2024-08-26

## 2024-08-26 RX ORDER — ATORVASTATIN CALCIUM 40 MG/1
40 TABLET, FILM COATED ORAL DAILY
Qty: 90 TABLET | Refills: 1 | Status: SHIPPED | OUTPATIENT
Start: 2024-08-26

## 2024-08-26 RX ORDER — METFORMIN HCL 500 MG
TABLET, EXTENDED RELEASE 24 HR ORAL
Qty: 180 TABLET | Refills: 1 | Status: SHIPPED | OUTPATIENT
Start: 2024-08-26

## 2024-08-26 RX ORDER — AZITHROMYCIN 250 MG/1
TABLET, FILM COATED ORAL
Qty: 6 TABLET | Refills: 0 | Status: SHIPPED | OUTPATIENT
Start: 2024-08-26

## 2024-08-26 RX ORDER — LISINOPRIL AND HYDROCHLOROTHIAZIDE 12.5; 2 MG/1; MG/1
2 TABLET ORAL DAILY
Qty: 180 TABLET | Refills: 1 | Status: SHIPPED | OUTPATIENT
Start: 2024-08-26

## 2024-08-26 NOTE — ASSESSMENT & PLAN NOTE
Hypertension is better-    Plan-Cont Zestoretic 2012.5 to 2 tablets daily and amlodipine 5 times daily No lower extremity swelling.  Check blood pressure twice a day 4-5 times a week and record   Follow a low-salt diet  Goal BP is less than 130/80

## 2024-08-26 NOTE — PATIENT INSTRUCTIONS
Patient informed about other centers such as newly opened  [facility] at [address] [address] [facility] [address] [place], [place] [address]    Goal BP <130/80-follow low salt diet    Zpak for bronchitis-robitussin prn cough

## 2024-08-26 NOTE — PROGRESS NOTES
"CHIEF COMPLAINT  Nessa Brown presents to [facility] for follow-up of Follow-up (Patient has no new issues or concerns ), labs were done for this visit , Diabetes, Hyperlipidemia, Hypertension, and Cough (Last Monday started a cough and fever patient is taking  ambroxol for the cough. ).    HPI    Here with -  42 yo pt with DM, HTN, chol among others here for f/u-doesn't know what meds he has  C/o cough x 1 week -neg covid at clinic--with yellow sputum--  BP at home 135/89, DBP - 27-95-hnejnyk is 137/86    A [facility] at [address] [place] [address] [address] [place] [address]    DM-A1c 6.5 (12/2023)  Vit D def-16 (12/2023)    PMSH -reviewed  ROS-cough /sputum , s/p fever one week ago-      Current Outpatient Medications:     amLODIPine (NORVASC) 5 MG tablet, Take 1 tablet by mouth Daily., Disp: 90 tablet, Rfl: 1    atorvastatin (LIPITOR) 40 MG tablet, Take 1 tablet by mouth Daily., Disp: 90 tablet, Rfl: 1    glucose blood test strip, Check BS BID or prn, Disp: 100 each, Rfl: 3    glucose monitor monitoring kit, Use 1 each As Needed (check BS BID or prn)., Disp: 1 each, Rfl: 0    Lancets misc, Check BS q d  or prn, Disp: 100 each, Rfl: 3    metFORMIN ER (GLUCOPHAGE-XR) 500 MG 24 hr tablet, 2 tablets p.o. daily, Disp: 180 tablet, Rfl: 1    azithromycin (Zithromax Z-Jet) 250 MG tablet, Take 2 tablets by mouth on day 1, then 1 tablet daily on days 2-5, Disp: 6 tablet, Rfl: 0    lisinopril-hydrochlorothiazide (Zestoretic) 20-12.5 MG per tablet, Take 2 tablets by mouth Daily., Disp: 180 tablet, Rfl: 1   PFSH reviewed.      OBJECTIVE  Vital Signs  Vitals:    08/26/24 0733 08/26/24 0811   BP: (!) 142/102 142/95   BP Location: Left arm Left arm   Patient Position: Sitting Sitting   Cuff Size: Small Adult Adult   Pulse: 86    Temp: 96.4 °F (35.8 °C)    TempSrc: Temporal    SpO2: 96%    Weight: 86.5 kg (190 lb 12.8 oz)    Height: 175.3 cm (69.02\")       Body mass " index is 28.16 kg/m².    Physical Exam  Vitals and nursing note reviewed.   Constitutional:       Appearance: Normal appearance.   HENT:      Head: Normocephalic and atraumatic.      Nose: Nose normal.   Eyes:      Extraocular Movements: Extraocular movements intact.      Pupils: Pupils are equal, round, and reactive to light.   Cardiovascular:      Rate and Rhythm: Normal rate and regular rhythm.   Pulmonary:      Effort: Pulmonary effort is normal.      Breath sounds: Normal breath sounds.   Abdominal:      General: Abdomen is flat.      Palpations: Abdomen is soft.   Musculoskeletal:      Cervical back: Normal range of motion and neck supple.   Skin:     General: Skin is warm and dry.   Neurological:      General: No focal deficit present.      Mental Status: He is alert and oriented to person, place, and time.   Psychiatric:         Mood and Affect: Mood normal.         Behavior: Behavior normal.          RESULTS REVIEW  Lab Results   Component Value Date    PROBNP 16.8 01/26/2022     CMP          12/29/2023    11:30 8/23/2024    08:41   CMP   Glucose 76  122    BUN 13  11    Creatinine 0.69  0.68    EGFR 117.8  118.3    Sodium 142  140    Potassium 3.8  4.1    Chloride 104  101    Calcium 9.5  9.3    Total Protein 7.4     Albumin 4.6     Globulin 2.8     Total Bilirubin 0.3     Alkaline Phosphatase 64     AST (SGOT) 27     ALT (SGPT) 51     Albumin/Globulin Ratio 1.6     BUN/Creatinine Ratio 18.8  16.2    Anion Gap 12.9  13.3      CBC w/diff          12/29/2023    11:30   CBC w/Diff   WBC 4.50    RBC 4.89    Hemoglobin 13.9    Hematocrit 41.3    MCV 84.5    MCH 28.4    MCHC 33.7    RDW 13.0    Platelets 283    Neutrophil Rel % 55.7    Immature Granulocyte Rel % 0.2    Lymphocyte Rel % 35.1    Monocyte Rel % 5.6    Eosinophil Rel % 2.7    Basophil Rel % 0.7       Lipid Panel          12/29/2023    11:30   Lipid Panel   Total Cholesterol 137    Triglycerides 101    HDL Cholesterol 49    VLDL Cholesterol 19    LDL  Cholesterol  69    LDL/HDL Ratio 1.38       Lab Results   Component Value Date    TSH 1.280 12/29/2023      Lab Results   Component Value Date    FREET4 1.05 12/29/2023      A1C Last 3 Results          12/29/2023    11:30 8/23/2024    08:41   HGBA1C Last 3 Results   Hemoglobin A1C 6.50  6.30       Lab Results   Component Value Date    EBUJHMYJ77 826 12/29/2023    XDSN19IS 45.2 08/23/2024    MG 2.1 12/29/2023        No Images in the past 120 days found..             ASSESSMENT & PLAN  Diagnoses and all orders for this visit:    1. Type 2 diabetes mellitus with hyperglycemia, without long-term current use of insulin (Primary)  Comments:  A1c 6.3-increase metformin XR to 500 mg 2 tablets daily  Orders:  -     Discontinue: metFORMIN ER (GLUCOPHAGE-XR) 500 MG 24 hr tablet; Take 1 tablet by mouth Daily With Breakfast.  Dispense: 180 tablet; Refill: 1  -     Comprehensive Metabolic Panel; Future  -     Lipid Panel; Future  -     Vitamin D,25-Hydroxy; Future  -     Microalbumin / Creatinine Urine Ratio - Urine, Clean Catch; Future  -     Microalbumin / Creatinine Urine Ratio - Urine, Clean Catch  -     metFORMIN ER (GLUCOPHAGE-XR) 500 MG 24 hr tablet; 2 tablets p.o. daily  Dispense: 180 tablet; Refill: 1    2. Primary hypertension  Assessment & Plan:  Hypertension is better-    Plan-Cont Zestoretic 2012.5 to 2 tablets daily and amlodipine 5 times daily No lower extremity swelling.  Check blood pressure twice a day 4-5 times a week and record   Follow a low-salt diet  Goal BP is less than 130/80    Orders:  -     amLODIPine (NORVASC) 5 MG tablet; Take 1 tablet by mouth Daily.  Dispense: 90 tablet; Refill: 1  -     Comprehensive Metabolic Panel; Future  -     Lipid Panel; Future  -     Vitamin D,25-Hydroxy; Future  -     Microalbumin / Creatinine Urine Ratio - Urine, Clean Catch; Future  -     Microalbumin / Creatinine Urine Ratio - Urine, Clean Catch    3. Pure hypercholesterolemia  -     atorvastatin (LIPITOR) 40 MG tablet;  Take 1 tablet by mouth Daily.  Dispense: 90 tablet; Refill: 1  -     Comprehensive Metabolic Panel; Future  -     Lipid Panel; Future  -     Vitamin D,25-Hydroxy; Future  -     Microalbumin / Creatinine Urine Ratio - Urine, Clean Catch; Future  -     Microalbumin / Creatinine Urine Ratio - Urine, Clean Catch    4. Vitamin D deficiency  Comments:  Vit D 45 (16 in 12/2023)  Orders:  -     Comprehensive Metabolic Panel; Future  -     Lipid Panel; Future  -     Vitamin D,25-Hydroxy; Future  -     Microalbumin / Creatinine Urine Ratio - Urine, Clean Catch; Future  -     Microalbumin / Creatinine Urine Ratio - Urine, Clean Catch    5. Cough, unspecified type  Comments:  Negative for COVID probable bronchitis will give a Z-Jet  Orders:  -     POCT SARS-CoV-2 Antigen DAVI + Flu  -     azithromycin (Zithromax Z-Jet) 250 MG tablet; Take 2 tablets by mouth on day 1, then 1 tablet daily on days 2-5  Dispense: 6 tablet; Refill: 0  -     Comprehensive Metabolic Panel; Future  -     Lipid Panel; Future  -     Vitamin D,25-Hydroxy; Future  -     Microalbumin / Creatinine Urine Ratio - Urine, Clean Catch; Future  -     Microalbumin / Creatinine Urine Ratio - Urine, Clean Catch    Other orders  -     lisinopril-hydrochlorothiazide (Zestoretic) 20-12.5 MG per tablet; Take 2 tablets by mouth Daily.  Dispense: 180 tablet; Refill: 1       Plan  Patient Instructions 8/26/2024   Patient informed about other centers such as newly opened  [facility] at [address] [place] [address]    Goal BP <130/80-follow low salt diet    Zpak for bronchitis-robitussin prn cough    All meds refilled today increase metformin XR to 5 mL 2 tablets daily       Older notes  Patient Instructions 1/8/24   Cont all meds  Walk 30 min   Daily  Refer to Colin Simeon to help with meds-gave him patient's  Mrs. Michael Urena [id number]- to help with his medications.  Cotn to monitor BP -goal <130/80  Check BS 3-4x/week-will see  if can get samples from [facility] clinic-  Start vit D 5000 units Mon Wed Friday and Saturday -  Start metformin  mg 1 daily  F/u 4-6 months     1/8/2024 visit  [facility] however patient does not qualify for assistance through them.  Also does not have an any health insurance through his work which is not provided.  Discussed importance of trying to get health insurance as well as Colin Simeon to help.     43-year-old patient here for  follow-up uncontrolled blood pressure and diabetes     Diabetes-new diagnosis-A1c equals 6.5 December 29, 2023-no urine for protein seen-needs meds     Cholesterol-much improved with atorvastatin.  LDL equals 69 HDL 49 cholesterol 137     Vitamin D deficiency uncontrolled vitamin D equals 16.9     Patient Instructions 11/29/23   Increase zestoretic 20-12.5 2 daily  Add amlodipine 5 mg daily  Check BP 2x/d-4-5x/week  Cont atorvastatin  DM/HTN info given  F/u 3 weeks   Do labs  Pt given info by clinic staff-Rae Abernathy on [facility] clinic for labs, etc  Pt has applied for hospital's indigent program already  Needs flu shot     November 29, 2023 visit  Pt here with -/friend-Mrs. Antonietta Urena-     41 yo Amharic speaking male here to establish care     Records reviewed, meds reviewed in detail, labs reviewed with patient.  Plan of care is as follows below.        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  To check on blood pressure and labs prior to visit    Patient was given instructions and counseling regarding his condition or for health maintenance advice. Please see specific information pulled into the AVS if appropriate.

## 2024-10-04 ENCOUNTER — LAB (OUTPATIENT)
Dept: INTERNAL MEDICINE | Age: 44
End: 2024-10-04
Payer: MEDICAID

## 2024-10-04 DIAGNOSIS — R05.9 COUGH, UNSPECIFIED TYPE: ICD-10-CM

## 2024-10-04 DIAGNOSIS — E11.65 TYPE 2 DIABETES MELLITUS WITH HYPERGLYCEMIA, WITHOUT LONG-TERM CURRENT USE OF INSULIN: ICD-10-CM

## 2024-10-04 DIAGNOSIS — E78.00 PURE HYPERCHOLESTEROLEMIA: ICD-10-CM

## 2024-10-04 DIAGNOSIS — I10 PRIMARY HYPERTENSION: ICD-10-CM

## 2024-10-04 DIAGNOSIS — E55.9 VITAMIN D DEFICIENCY: ICD-10-CM

## 2024-10-04 LAB
25(OH)D3 SERPL-MCNC: 41.2 NG/ML (ref 30–100)
ALBUMIN SERPL-MCNC: 4.8 G/DL (ref 3.5–5.2)
ALBUMIN/GLOB SERPL: 2 G/DL
ALP SERPL-CCNC: 56 U/L (ref 39–117)
ALT SERPL W P-5'-P-CCNC: 25 U/L (ref 1–41)
ANION GAP SERPL CALCULATED.3IONS-SCNC: 9 MMOL/L (ref 5–15)
AST SERPL-CCNC: 19 U/L (ref 1–40)
BILIRUB SERPL-MCNC: 0.5 MG/DL (ref 0–1.2)
BUN SERPL-MCNC: 12 MG/DL (ref 6–20)
BUN/CREAT SERPL: 19.4 (ref 7–25)
CALCIUM SPEC-SCNC: 9.8 MG/DL (ref 8.6–10.5)
CHLORIDE SERPL-SCNC: 104 MMOL/L (ref 98–107)
CHOLEST SERPL-MCNC: 172 MG/DL (ref 0–200)
CO2 SERPL-SCNC: 26 MMOL/L (ref 22–29)
CREAT SERPL-MCNC: 0.62 MG/DL (ref 0.76–1.27)
EGFRCR SERPLBLD CKD-EPI 2021: 121.6 ML/MIN/1.73
GLOBULIN UR ELPH-MCNC: 2.4 GM/DL
GLUCOSE SERPL-MCNC: 108 MG/DL (ref 65–99)
HDLC SERPL-MCNC: 61 MG/DL (ref 40–60)
LDLC SERPL CALC-MCNC: 94 MG/DL (ref 0–100)
LDLC/HDLC SERPL: 1.51 {RATIO}
POTASSIUM SERPL-SCNC: 3.9 MMOL/L (ref 3.5–5.2)
PROT SERPL-MCNC: 7.2 G/DL (ref 6–8.5)
SODIUM SERPL-SCNC: 139 MMOL/L (ref 136–145)
TRIGL SERPL-MCNC: 95 MG/DL (ref 0–150)
VLDLC SERPL-MCNC: 17 MG/DL (ref 5–40)

## 2024-10-04 PROCEDURE — 82306 VITAMIN D 25 HYDROXY: CPT | Performed by: INTERNAL MEDICINE

## 2024-10-04 PROCEDURE — 80053 COMPREHEN METABOLIC PANEL: CPT | Performed by: INTERNAL MEDICINE

## 2024-10-04 PROCEDURE — 80061 LIPID PANEL: CPT | Performed by: INTERNAL MEDICINE

## 2024-10-04 PROCEDURE — 36415 COLL VENOUS BLD VENIPUNCTURE: CPT | Performed by: INTERNAL MEDICINE

## 2024-10-14 ENCOUNTER — OFFICE VISIT (OUTPATIENT)
Dept: INTERNAL MEDICINE | Age: 44
End: 2024-10-14
Payer: MEDICAID

## 2024-10-14 VITALS
HEIGHT: 69 IN | TEMPERATURE: 97.6 F | DIASTOLIC BLOOD PRESSURE: 98 MMHG | WEIGHT: 191 LBS | SYSTOLIC BLOOD PRESSURE: 142 MMHG | BODY MASS INDEX: 28.29 KG/M2 | OXYGEN SATURATION: 98 % | HEART RATE: 76 BPM

## 2024-10-14 DIAGNOSIS — I10 PRIMARY HYPERTENSION: ICD-10-CM

## 2024-10-14 DIAGNOSIS — E11.65 TYPE 2 DIABETES MELLITUS WITH HYPERGLYCEMIA, WITHOUT LONG-TERM CURRENT USE OF INSULIN: Primary | ICD-10-CM

## 2024-10-14 DIAGNOSIS — E78.00 PURE HYPERCHOLESTEROLEMIA: ICD-10-CM

## 2024-10-14 PROCEDURE — 3080F DIAST BP >= 90 MM HG: CPT | Performed by: INTERNAL MEDICINE

## 2024-10-14 PROCEDURE — 99214 OFFICE O/P EST MOD 30 MIN: CPT | Performed by: INTERNAL MEDICINE

## 2024-10-14 PROCEDURE — 3044F HG A1C LEVEL LT 7.0%: CPT | Performed by: INTERNAL MEDICINE

## 2024-10-14 PROCEDURE — 3077F SYST BP >= 140 MM HG: CPT | Performed by: INTERNAL MEDICINE

## 2024-10-14 PROCEDURE — 1159F MED LIST DOCD IN RCRD: CPT | Performed by: INTERNAL MEDICINE

## 2024-10-14 PROCEDURE — 1160F RVW MEDS BY RX/DR IN RCRD: CPT | Performed by: INTERNAL MEDICINE

## 2024-10-14 RX ORDER — AMLODIPINE BESYLATE 10 MG/1
10 TABLET ORAL DAILY
Qty: 90 TABLET | Refills: 1 | Status: SHIPPED | OUTPATIENT
Start: 2024-10-14

## 2024-10-14 RX ORDER — METFORMIN HCL 500 MG
500 TABLET, EXTENDED RELEASE 24 HR ORAL 2 TIMES DAILY
Qty: 180 TABLET | Refills: 1 | Status: SHIPPED | OUTPATIENT
Start: 2024-10-14

## 2024-10-14 NOTE — ASSESSMENT & PLAN NOTE
Hypertension is not controlled 142/98    Plan-Cont Zestoretic 20-12.5 to 2 tablets daily and increase amlodipine 5mg to 10 mg once a da-no lower extremity swelling.  Check blood pressure twice a day 4-5 times a week and record   Follow a low-salt diet  Goal BP is less than 130/80

## 2024-10-14 NOTE — PROGRESS NOTES
"CHIEF COMPLAINT  Nessa Brown presents to [facility] for follow-up of Follow-up (6 week follow up .  Patient says he has been doing ok .  Patient says that in the morning his bp has been running high but goes down in the evening .), Labs Only, and Hypertension.    HPI  Patient understands some English declined  was able to communicate with him  42-year-old patient here for follow-up of hypertension and diabetes     BP reviewed log-ranging from 142/ /84 to 126/77 highest is 141/98  Records reviewed, meds reviewed in detail, labs reviewed with patient.  Plan of care is as follows below.    A1c 6.3 on August 23, 2024  LDL 94, HDL 61,  10/4/2024, glucose 108    [facility]-Reviewed  ROS-and labs        Current Outpatient Medications:     atorvastatin (LIPITOR) 40 MG tablet, Take 1 tablet by mouth Daily., Disp: 90 tablet, Rfl: 1    glucose blood test strip, Check BS BID or prn, Disp: 100 each, Rfl: 3    glucose monitor monitoring kit, Use 1 each As Needed (check BS BID or prn)., Disp: 1 each, Rfl: 0    Lancets misc, Check BS q d  or prn, Disp: 100 each, Rfl: 3    lisinopril-hydrochlorothiazide (Zestoretic) 20-12.5 MG per tablet, Take 2 tablets by mouth Daily., Disp: 180 tablet, Rfl: 1    amLODIPine (NORVASC) 10 MG tablet, Take 1 tablet by mouth Daily., Disp: 90 tablet, Rfl: 1    metFORMIN ER (GLUCOPHAGE-XR) 500 MG 24 hr tablet, Take 1 tablet by mouth 2 (Two) Times a Day., Disp: 180 tablet, Rfl: 1   PFSH reviewed.      OBJECTIVE  Vital Signs  Vitals:    10/14/24 0813 10/14/24 0859   BP: 150/100 142/98   BP Location: Left arm Left arm   Patient Position: Sitting Sitting   Cuff Size: Adult Adult   Pulse: 76    Temp: 97.6 °F (36.4 °C)    SpO2: 98%    Weight: 86.6 kg (191 lb)    Height: 175.3 cm (69.02\")       Body mass index is 28.19 kg/m².    Physical Exam  Vitals and nursing note reviewed.   Constitutional:       Appearance: Normal appearance.   HENT: "      Head: Normocephalic and atraumatic.      Nose: Nose normal.   Eyes:      Extraocular Movements: Extraocular movements intact.      Pupils: Pupils are equal, round, and reactive to light.   Cardiovascular:      Rate and Rhythm: Normal rate and regular rhythm.   Pulmonary:      Effort: Pulmonary effort is normal.      Breath sounds: Normal breath sounds.   Abdominal:      General: Abdomen is flat.      Palpations: Abdomen is soft.   Musculoskeletal:      Cervical back: Normal range of motion and neck supple.   Skin:     General: Skin is warm and dry.   Neurological:      General: No focal deficit present.      Mental Status: He is alert and oriented to person, place, and time.   Psychiatric:         Mood and Affect: Mood normal.         Behavior: Behavior normal.          RESULTS REVIEW  Lab Results   Component Value Date    PROBNP 16.8 01/26/2022     CMP          12/29/2023    11:30 8/23/2024    08:41 10/4/2024    09:27   CMP   Glucose 76  122  108    BUN 13  11  12    Creatinine 0.69  0.68  0.62    EGFR 117.8  118.3  121.6    Sodium 142  140  139    Potassium 3.8  4.1  3.9    Chloride 104  101  104    Calcium 9.5  9.3  9.8    Total Protein 7.4   7.2    Albumin 4.6   4.8    Globulin 2.8   2.4    Total Bilirubin 0.3   0.5    Alkaline Phosphatase 64   56    AST (SGOT) 27   19    ALT (SGPT) 51   25    Albumin/Globulin Ratio 1.6   2.0    BUN/Creatinine Ratio 18.8  16.2  19.4    Anion Gap 12.9  13.3  9.0      CBC w/diff          12/29/2023    11:30   CBC w/Diff   WBC 4.50    RBC 4.89    Hemoglobin 13.9    Hematocrit 41.3    MCV 84.5    MCH 28.4    MCHC 33.7    RDW 13.0    Platelets 283    Neutrophil Rel % 55.7    Immature Granulocyte Rel % 0.2    Lymphocyte Rel % 35.1    Monocyte Rel % 5.6    Eosinophil Rel % 2.7    Basophil Rel % 0.7       Lipid Panel          12/29/2023    11:30 10/4/2024    09:27   Lipid Panel   Total Cholesterol 137  172    Triglycerides 101  95    HDL Cholesterol 49  61    VLDL Cholesterol 19   17    LDL Cholesterol  69  94    LDL/HDL Ratio 1.38  1.51       Lab Results   Component Value Date    TSH 1.280 12/29/2023      Lab Results   Component Value Date    FREET4 1.05 12/29/2023      A1C Last 3 Results          12/29/2023    11:30 8/23/2024    08:41   HGBA1C Last 3 Results   Hemoglobin A1C 6.50  6.30       Lab Results   Component Value Date    MVCUQDKJ94 826 12/29/2023    ZJNW48SS 41.2 10/04/2024    MG 2.1 12/29/2023        No Images in the past 120 days found..             ASSESSMENT & PLAN  Diagnoses and all orders for this visit:    1. Type 2 diabetes mellitus with hyperglycemia, without long-term current use of insulin (Primary)  Assessment & Plan:  A1c equals 6.3 August 2024  Plan-increase metformin  mg to 2 tablets daily only taking this once a day  Goal A1c is less than 6  Follow a low-carb diet  Recheck A1c at next visit    Orders:  -     metFORMIN ER (GLUCOPHAGE-XR) 500 MG 24 hr tablet; Take 1 tablet by mouth 2 (Two) Times a Day.  Dispense: 180 tablet; Refill: 1  -     Hemoglobin A1c; Future  -     MicroAlbumin, Urine, Random - Urine, Clean Catch; Future    2. Primary hypertension  Assessment & Plan:  Hypertension is not controlled 142/98    Plan-Cont Zestoretic 20-12.5 to 2 tablets daily and increase amlodipine 5mg to 10 mg once a da-no lower extremity swelling.  Check blood pressure twice a day 4-5 times a week and record   Follow a low-salt diet  Goal BP is less than 130/80    Orders:  -     amLODIPine (NORVASC) 10 MG tablet; Take 1 tablet by mouth Daily.  Dispense: 90 tablet; Refill: 1  -     Hemoglobin A1c; Future  -     MicroAlbumin, Urine, Random - Urine, Clean Catch; Future    3. Pure hypercholesterolemia  -     Hemoglobin A1c; Future  -     MicroAlbumin, Urine, Random - Urine, Clean Catch; Future         Plan-10/14/2024  Patient Instructions   Increase amlodipine to 10 mg once a day  Goal blood pressure is less than 130/80  Follow a low-salt diet less- Pasta bread and  rice  Increase metformin  mg to 2 tablets daily  F/u 4-6 weejks to check BP and sugar  needs A1c and urine microalbumin in 4 weeks-come 2-3 days before appointment  Conuisder changing statin at next lipid check if not < 70    Referred to social service for help with getting insurance     FOLLOW UP  Return in about 29 days (around 11/12/2024) for Next scheduled follow up, Recheck.    Patient was given instructions and counseling regarding his condition or for health maintenance advice. Please see specific information pulled into the AVS if appropriate.     Older notes  Patient Instructions 8/26/2024   Patient informed about other centers such as newly opened  [facility] at [address] [place] [address]     Goal BP <130/80-follow low salt diet     Zpak for bronchitis-robitussin prn cough     All meds refilled today increase metformin XR to 5 mL 2 tablets daily    8/26/2024 visit  Here with -  44 yo pt with DM, HTN, chol among others here for f/u-doesn't know what meds he has  C/o cough x 1 week -neg covid at clinic--with yellow sputum--  BP at home 135/89, DBP - 42-95-isdvxud is 137/86     A [facility] at [address] [place] [address]     DM-A1c 6.5 (12/2023)  Vit D def-16 (12/2023)    Patient Instructions on 1/8/24   Cont all meds  Walk 30 min   Daily  Refer to Colin Simeon to help with meds-gave him patient's  Mrs. Michael Urena [id number]- to help with his medications.  Cotn to monitor BP -goal <130/80  Check BS 3-4x/week-will see if can get samples from Comm clinic-  Start vit D 5000 units Mon Wed Friday and Saturday -  Start metformin  mg 1 daily  F/u 4-6 months      1/8/2024  Patient here for  follow-up uncontrolled blood pressure and diabetes     Diabetes-new diagnosis-A1c equals 6.5 December 29, 2023-no urine for protein seen-needs meds     Cholesterol-much improved with atorvastatin.  LDL equals 69 HDL 49 cholesterol  137     Vitamin D deficiency uncontrolled vitamin D equals 16.9     Patient Instructions 11/29/23   Increase zestoretic 20-12.5 2 daily  Add amlodipine 5 mg daily  Check BP 2x/d-4-5x/week  Cont atorvastatin  DM/HTN info given  F/u 3 weeks   Do labs  Pt given info by clinic staff-Rae Abernathy on [facility] clinic for labs, etc  Pt has applied for hospital's indigent program already  Needs flu shot     November 29, 2023 visit  Pt here with -/friend-Mrs. Antonietta Urena-     43 yo Andorran speaking male here to establish care     Records reviewed, meds reviewed in detail, labs reviewed with patient.  Plan of care is as follows below.        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

## 2024-10-14 NOTE — ASSESSMENT & PLAN NOTE
A1c equals 6.3 August 2024  Plan-increase metformin  mg to 2 tablets daily only taking this once a day  Goal A1c is less than 6  Follow a low-carb diet  Recheck A1c at next visit

## 2024-10-14 NOTE — PATIENT INSTRUCTIONS
Increase amlodipine to 10 mg once a day  Goal blood pressure is less than 130/80  Follow a low-salt diet less- Pasta bread and rice  Increase metformin  mg to 2 tablets daily  F/u 4-6 sabine to check BP and sugar  needs A1c and urine microalbumin in 4 weeks-come 2-3 days before appointment  Conuisder changing statin at next lipid check if not < 70    Referred to social service for help with getting insurance

## 2024-11-08 ENCOUNTER — LAB (OUTPATIENT)
Dept: INTERNAL MEDICINE | Age: 44
End: 2024-11-08
Payer: MEDICAID

## 2024-11-08 DIAGNOSIS — I10 PRIMARY HYPERTENSION: ICD-10-CM

## 2024-11-08 DIAGNOSIS — E78.00 PURE HYPERCHOLESTEROLEMIA: ICD-10-CM

## 2024-11-08 DIAGNOSIS — E11.65 TYPE 2 DIABETES MELLITUS WITH HYPERGLYCEMIA, WITHOUT LONG-TERM CURRENT USE OF INSULIN: ICD-10-CM

## 2024-11-08 LAB
ALBUMIN UR-MCNC: <1.2 MG/DL
HBA1C MFR BLD: 5.9 % (ref 4.8–5.6)

## 2024-11-08 PROCEDURE — 83036 HEMOGLOBIN GLYCOSYLATED A1C: CPT | Performed by: INTERNAL MEDICINE

## 2024-11-08 PROCEDURE — 82043 UR ALBUMIN QUANTITATIVE: CPT | Performed by: INTERNAL MEDICINE

## 2024-11-08 PROCEDURE — 36415 COLL VENOUS BLD VENIPUNCTURE: CPT | Performed by: INTERNAL MEDICINE

## 2024-11-13 ENCOUNTER — OFFICE VISIT (OUTPATIENT)
Dept: INTERNAL MEDICINE | Age: 44
End: 2024-11-13

## 2024-11-13 VITALS
DIASTOLIC BLOOD PRESSURE: 99 MMHG | OXYGEN SATURATION: 96 % | BODY MASS INDEX: 28.29 KG/M2 | HEIGHT: 69 IN | TEMPERATURE: 97.7 F | SYSTOLIC BLOOD PRESSURE: 130 MMHG | WEIGHT: 191 LBS | HEART RATE: 76 BPM

## 2024-11-13 DIAGNOSIS — E78.00 PURE HYPERCHOLESTEROLEMIA: ICD-10-CM

## 2024-11-13 DIAGNOSIS — E55.9 VITAMIN D DEFICIENCY: ICD-10-CM

## 2024-11-13 DIAGNOSIS — E11.65 TYPE 2 DIABETES MELLITUS WITH HYPERGLYCEMIA, WITHOUT LONG-TERM CURRENT USE OF INSULIN: ICD-10-CM

## 2024-11-13 DIAGNOSIS — I10 PRIMARY HYPERTENSION: ICD-10-CM

## 2024-11-13 DIAGNOSIS — Z23 NEED FOR INFLUENZA VACCINATION: Primary | ICD-10-CM

## 2024-11-13 DIAGNOSIS — Z00.00 ROUTINE HISTORY AND PHYSICAL EXAMINATION OF ADULT: ICD-10-CM

## 2024-11-13 NOTE — ASSESSMENT & PLAN NOTE
Lipid abnormalities arebetter controlled-LDL equals 69 HDL 49 cholesterol 137    Plan continue atorvastatin 40 mg 1 daily and recheck lipids at next visit no myalgias

## 2024-11-13 NOTE — ASSESSMENT & PLAN NOTE
A1c equals 6.3 August 2024--down to 5.9 11/2024  Plan-Cont with metformin  mg to 2 tablets daily  Patient is at goal- A1c is less than 6  Follow a low-carb diet  Recheck A1c at next visit

## 2024-11-13 NOTE — PROGRESS NOTES
"CHIEF COMPLAINT  Nessa Brown presents to [facility] for follow-up of Follow-up (4 week follow up Patient says that his sugar has been going down and patient has been keeping a record of his bp ), Diabetes, and Hypertension.    HPI  43 patient with HTN , DM, chol here for f/u of BP and sugar  A1c 5.9 (11/2024) neg urine microalbumin-on metformin  LDL 94, HDL 61,  10/4/2024    Records reviewed, meds reviewed in detail, labs reviewed with patient.  Plan of care is as follows below.    [facility]-Reviewed  ROS-no HA, no myalgias    Current Outpatient Medications:     amLODIPine (NORVASC) 10 MG tablet, Take 1 tablet by mouth Daily., Disp: 90 tablet, Rfl: 1    glucose blood test strip, Check BS BID or prn, Disp: 100 each, Rfl: 3    glucose monitor monitoring kit, Use 1 each As Needed (check BS BID or prn)., Disp: 1 each, Rfl: 0    Lancets misc, Check BS q d  or prn, Disp: 100 each, Rfl: 3    lisinopril-hydrochlorothiazide (Zestoretic) 20-12.5 MG per tablet, Take 2 tablets by mouth Daily., Disp: 180 tablet, Rfl: 1    metFORMIN ER (GLUCOPHAGE-XR) 500 MG 24 hr tablet, Take 1 tablet by mouth 2 (Two) Times a Day., Disp: 180 tablet, Rfl: 1    atorvastatin (LIPITOR) 40 MG tablet, Take 1 tablet by mouth Daily., Disp: 90 tablet, Rfl: 1   PFSH reviewed.      OBJECTIVE  Vital Signs  Vitals:    11/13/24 0843   BP: 130/99   BP Location: Left arm   Patient Position: Sitting   Cuff Size: Small Adult   Pulse: 76   Temp: 97.7 °F (36.5 °C)   SpO2: 96%   Weight: 86.6 kg (191 lb)   Height: 175.3 cm (69.02\")      Body mass index is 28.19 kg/m².    Physical Exam  Vitals and nursing note reviewed.   Constitutional:       Appearance: Normal appearance.   HENT:      Head: Normocephalic and atraumatic.      Nose: Nose normal.   Eyes:      Extraocular Movements: Extraocular movements intact.      Pupils: Pupils are equal, round, and reactive to light.   Cardiovascular:      Rate and Rhythm: Normal " rate and regular rhythm.   Pulmonary:      Effort: Pulmonary effort is normal.      Breath sounds: Normal breath sounds.   Abdominal:      General: Abdomen is flat.      Palpations: Abdomen is soft.   Musculoskeletal:      Cervical back: Normal range of motion and neck supple.   Skin:     General: Skin is warm and dry.   Neurological:      General: No focal deficit present.      Mental Status: He is alert and oriented to person, place, and time.      Comments: Sensation intact to light touch +2 dorsalis pedis and posterior tibialis pulses mild hammertoes/foot deformity   Psychiatric:         Mood and Affect: Mood normal.         Behavior: Behavior normal.          RESULTS REVIEW  Lab Results   Component Value Date    PROBNP 16.8 01/26/2022     CMP          12/29/2023    11:30 8/23/2024    08:41 10/4/2024    09:27   CMP   Glucose 76  122  108    BUN 13  11  12    Creatinine 0.69  0.68  0.62    EGFR 117.8  118.3  121.6    Sodium 142  140  139    Potassium 3.8  4.1  3.9    Chloride 104  101  104    Calcium 9.5  9.3  9.8    Total Protein 7.4   7.2    Albumin 4.6   4.8    Globulin 2.8   2.4    Total Bilirubin 0.3   0.5    Alkaline Phosphatase 64   56    AST (SGOT) 27   19    ALT (SGPT) 51   25    Albumin/Globulin Ratio 1.6   2.0    BUN/Creatinine Ratio 18.8  16.2  19.4    Anion Gap 12.9  13.3  9.0      CBC w/diff          12/29/2023    11:30   CBC w/Diff   WBC 4.50    RBC 4.89    Hemoglobin 13.9    Hematocrit 41.3    MCV 84.5    MCH 28.4    MCHC 33.7    RDW 13.0    Platelets 283    Neutrophil Rel % 55.7    Immature Granulocyte Rel % 0.2    Lymphocyte Rel % 35.1    Monocyte Rel % 5.6    Eosinophil Rel % 2.7    Basophil Rel % 0.7       Lipid Panel          12/29/2023    11:30 10/4/2024    09:27   Lipid Panel   Total Cholesterol 137  172    Triglycerides 101  95    HDL Cholesterol 49  61    VLDL Cholesterol 19  17    LDL Cholesterol  69  94    LDL/HDL Ratio 1.38  1.51       Lab Results   Component Value Date    TSH 1.280  12/29/2023      Lab Results   Component Value Date    FREET4 1.05 12/29/2023      A1C Last 3 Results          12/29/2023    11:30 8/23/2024    08:41 11/8/2024    08:22   HGBA1C Last 3 Results   Hemoglobin A1C 6.50  6.30  5.90       Lab Results   Component Value Date    HQQIEWOM58 826 12/29/2023    DLPZ29DZ 41.2 10/04/2024    MG 2.1 12/29/2023        No Images in the past 120 days found..             ASSESSMENT & PLAN  Diagnoses and all orders for this visit:    1. Routine history and physical examination of adult (Primary)  Assessment & Plan:  Ages 40-64 Counseling/Anticipatory Guidance Discussed: nutrition, physical activity, healthy weight, injury prevention, misuse of tobacco, alcohol and drugs, sexual behavior and STDs, contraception, dental health, mental health, immunizations, screenings, and use of seatbelt    Orders:  -     Comprehensive Metabolic Panel; Future  -     Lipid Panel; Future  -     CBC & Differential; Future  -     Vitamin B12; Future  -     Folate; Future  -     Vitamin D,25-Hydroxy; Future  -     Magnesium; Future  -     Microalbumin / Creatinine Urine Ratio - Urine, Clean Catch; Future  -     Hemoglobin A1c; Future  -     TSH+Free T4; Future  -     T3, Free; Future    2. Primary hypertension  Assessment & Plan:  Pressure is better controlled reviewed BP log and ranging from 123/82 117/77 this morning highest was 133/89    Plan-Cont with Zestoretic 20-12.5  2 tablets daily and amlodipine 10 mg q d  Goal BP <130/80    Orders:  -     Comprehensive Metabolic Panel; Future  -     Lipid Panel; Future  -     CBC & Differential; Future  -     Vitamin B12; Future  -     Folate; Future  -     Vitamin D,25-Hydroxy; Future  -     Magnesium; Future  -     Microalbumin / Creatinine Urine Ratio - Urine, Clean Catch; Future  -     Hemoglobin A1c; Future  -     TSH+Free T4; Future  -     T3, Free; Future    3. Pure hypercholesterolemia  Assessment & Plan:  Lipid abnormalities arebetter controlled-LDL equals  69 HDL 49 cholesterol 137    Plan continue atorvastatin 40 mg 1 daily and recheck lipids at next visit no myalgias    Orders:  -     Comprehensive Metabolic Panel; Future  -     Lipid Panel; Future  -     CBC & Differential; Future  -     Vitamin B12; Future  -     Folate; Future  -     Vitamin D,25-Hydroxy; Future  -     Magnesium; Future  -     Microalbumin / Creatinine Urine Ratio - Urine, Clean Catch; Future  -     Hemoglobin A1c; Future  -     TSH+Free T4; Future  -     T3, Free; Future    4. Type 2 diabetes mellitus with hyperglycemia, without long-term current use of insulin  Assessment & Plan:  A1c equals 6.3 August 2024--down to 5.9 11/2024  Plan-Cont with metformin  mg to 2 tablets daily  Patient is at goal- A1c is less than 6  Follow a low-carb diet  Recheck A1c at next visit    Orders:  -     Comprehensive Metabolic Panel; Future  -     Lipid Panel; Future  -     CBC & Differential; Future  -     Vitamin B12; Future  -     Folate; Future  -     Vitamin D,25-Hydroxy; Future  -     Magnesium; Future  -     Microalbumin / Creatinine Urine Ratio - Urine, Clean Catch; Future  -     Hemoglobin A1c; Future  -     TSH+Free T4; Future  -     T3, Free; Future    5. Vitamin D deficiency  Comments:  Continue with vitamin D check levels at next visit  Orders:  -     Comprehensive Metabolic Panel; Future  -     Lipid Panel; Future  -     CBC & Differential; Future  -     Vitamin B12; Future  -     Folate; Future  -     Vitamin D,25-Hydroxy; Future  -     Magnesium; Future  -     Microalbumin / Creatinine Urine Ratio - Urine, Clean Catch; Future  -     Hemoglobin A1c; Future  -     TSH+Free T4; Future  -     T3, Free; Future                 Patient Instructions   Give flu shot today  Continue all meds  Follow-up in 5 months with labs prior  To monitor blood pressure goal is less than 130/80     FOLLOW UP  Return in about 5 months (around 4/13/2025) for Recheck.    Patient was given instructions and counseling  regarding his condition or for health maintenance advice. Please see specific information pulled into the AVS if appropriate.     Older Notes  10/14/2024  Patient Instructions   Increase amlodipine to 10 mg once a day  Goal blood pressure is less than 130/80  Follow a low-salt diet less- Pasta bread and rice  Increase metformin  mg to 2 tablets daily  F/u 4-6 weeks to check BP and sugar  needs A1c and urine microalbumin in 4 weeks-come 2-3 days before appointment  Consider changing statin at next lipid check if not < 70     Referred to social service for help with getting insurance     10/14/2024 visit   patient here for follow-up of hypertension and diabetes      BP reviewed log-ranging from 142/ /84 to 126/77 highest is 141/98  Records reviewed, meds reviewed in detail, labs reviewed with patient.  Plan of care is as follows below.     A1c 6.3 on August 23, 2024  LDL 94, HDL 61,  10/4/2024, glucose 108     Patient Instructions 8/26/2024   Patient informed about other centers such as newly opened  [facility] at [address] [place] [address]     Goal BP <130/80-follow low salt diet     Zpak for bronchitis-robitussin prn cough     All meds refilled today increase metformin XR to 500 mg 2 tablets daily          8/26/24 visit  pt with DM, HTN, chol among others here for f/u-doesn't know what meds he has  C/o cough x 1 week -neg covid at clinic--with yellow sputum--  BP at home 135/89, DBP - 07-83-nofbvbl is 137/86     [facility] at [address] [place] [address]     DM-A1c 6.5 (12/2023)  Vit D def-16 (12/2023)

## 2024-11-13 NOTE — ASSESSMENT & PLAN NOTE
Ages 40-64 Counseling/Anticipatory Guidance Discussed: nutrition, physical activity, healthy weight, injury prevention, misuse of tobacco, alcohol and drugs, sexual behavior and STDs, contraception, dental health, mental health, immunizations, screenings, and use of seatbelt

## 2024-11-13 NOTE — ASSESSMENT & PLAN NOTE
Pressure is better controlled reviewed BP log and ranging from 123/82 117/77 this morning highest was 133/89    Plan-Cont with Zestoretic 20-12.5  2 tablets daily and amlodipine 10 mg q d  Goal BP <130/80

## 2024-11-13 NOTE — PATIENT INSTRUCTIONS
Give flu shot today  Continue all meds  Follow-up in 5 months with labs prior  To monitor blood pressure goal is less than 130/80

## 2024-12-28 ENCOUNTER — APPOINTMENT (OUTPATIENT)
Dept: CT IMAGING | Facility: HOSPITAL | Age: 44
End: 2024-12-28
Payer: MEDICAID

## 2024-12-28 ENCOUNTER — HOSPITAL ENCOUNTER (OUTPATIENT)
Facility: HOSPITAL | Age: 44
Setting detail: OBSERVATION
Discharge: HOME | End: 2024-12-30
Attending: EMERGENCY MEDICINE | Admitting: INTERNAL MEDICINE
Payer: MEDICAID

## 2024-12-28 ENCOUNTER — APPOINTMENT (OUTPATIENT)
Dept: GENERAL RADIOLOGY | Facility: HOSPITAL | Age: 44
End: 2024-12-28
Payer: MEDICAID

## 2024-12-28 DIAGNOSIS — R13.12 DYSPHAGIA, OROPHARYNGEAL: ICD-10-CM

## 2024-12-28 DIAGNOSIS — G45.9 TIA (TRANSIENT ISCHEMIC ATTACK): Primary | ICD-10-CM

## 2024-12-28 DIAGNOSIS — R26.2 DIFFICULTY IN WALKING: ICD-10-CM

## 2024-12-28 LAB
ABO GROUP BLD: NORMAL
ALBUMIN SERPL-MCNC: 5.1 G/DL (ref 3.5–5.2)
ALBUMIN/GLOB SERPL: 1.7 G/DL
ALP SERPL-CCNC: 56 U/L (ref 39–117)
ALT SERPL W P-5'-P-CCNC: 43 U/L (ref 1–41)
ANION GAP SERPL CALCULATED.3IONS-SCNC: 14.9 MMOL/L (ref 5–15)
APTT PPP: 25.1 SECONDS (ref 24.2–34.2)
AST SERPL-CCNC: 43 U/L (ref 1–40)
BASOPHILS # BLD AUTO: 0.07 10*3/MM3 (ref 0–0.2)
BASOPHILS NFR BLD AUTO: 0.9 % (ref 0–1.5)
BILIRUB SERPL-MCNC: 0.4 MG/DL (ref 0–1.2)
BLD GP AB SCN SERPL QL: NEGATIVE
BUN SERPL-MCNC: 19 MG/DL (ref 6–20)
BUN/CREAT SERPL: 16.5 (ref 7–25)
CALCIUM SPEC-SCNC: 9.9 MG/DL (ref 8.6–10.5)
CHLORIDE SERPL-SCNC: 98 MMOL/L (ref 98–107)
CO2 SERPL-SCNC: 27.1 MMOL/L (ref 22–29)
CREAT SERPL-MCNC: 1.15 MG/DL (ref 0.76–1.27)
DEPRECATED RDW RBC AUTO: 39.8 FL (ref 37–54)
EGFRCR SERPLBLD CKD-EPI 2021: 80.5 ML/MIN/1.73
EOSINOPHIL # BLD AUTO: 0.11 10*3/MM3 (ref 0–0.4)
EOSINOPHIL NFR BLD AUTO: 1.5 % (ref 0.3–6.2)
ERYTHROCYTE [DISTWIDTH] IN BLOOD BY AUTOMATED COUNT: 12.8 % (ref 12.3–15.4)
GLOBULIN UR ELPH-MCNC: 3 GM/DL
GLUCOSE BLDC GLUCOMTR-MCNC: 145 MG/DL (ref 70–99)
GLUCOSE SERPL-MCNC: 154 MG/DL (ref 65–99)
HCT VFR BLD AUTO: 41.8 % (ref 37.5–51)
HGB BLD-MCNC: 14.1 G/DL (ref 13–17.7)
HOLD SPECIMEN: NORMAL
HOLD SPECIMEN: NORMAL
IMM GRANULOCYTES # BLD AUTO: 0.02 10*3/MM3 (ref 0–0.05)
IMM GRANULOCYTES NFR BLD AUTO: 0.3 % (ref 0–0.5)
INR PPP: 0.89 (ref 0.86–1.15)
LYMPHOCYTES # BLD AUTO: 2.3 10*3/MM3 (ref 0.7–3.1)
LYMPHOCYTES NFR BLD AUTO: 30.6 % (ref 19.6–45.3)
MCH RBC QN AUTO: 28.7 PG (ref 26.6–33)
MCHC RBC AUTO-ENTMCNC: 33.7 G/DL (ref 31.5–35.7)
MCV RBC AUTO: 85 FL (ref 79–97)
MONOCYTES # BLD AUTO: 0.62 10*3/MM3 (ref 0.1–0.9)
MONOCYTES NFR BLD AUTO: 8.2 % (ref 5–12)
NEUTROPHILS NFR BLD AUTO: 4.4 10*3/MM3 (ref 1.7–7)
NEUTROPHILS NFR BLD AUTO: 58.5 % (ref 42.7–76)
NRBC BLD AUTO-RTO: 0 /100 WBC (ref 0–0.2)
PLATELET # BLD AUTO: 282 10*3/MM3 (ref 140–450)
PMV BLD AUTO: 10 FL (ref 6–12)
POTASSIUM SERPL-SCNC: 3.3 MMOL/L (ref 3.5–5.2)
PROT SERPL-MCNC: 8.1 G/DL (ref 6–8.5)
PROTHROMBIN TIME: 12.3 SECONDS (ref 11.8–14.9)
QT INTERVAL: 411 MS
QTC INTERVAL: 444 MS
RBC # BLD AUTO: 4.92 10*6/MM3 (ref 4.14–5.8)
RH BLD: POSITIVE
SODIUM SERPL-SCNC: 140 MMOL/L (ref 136–145)
T&S EXPIRATION DATE: NORMAL
WBC NRBC COR # BLD AUTO: 7.52 10*3/MM3 (ref 3.4–10.8)
WHOLE BLOOD HOLD COAG: NORMAL
WHOLE BLOOD HOLD SPECIMEN: NORMAL

## 2024-12-28 PROCEDURE — 93005 ELECTROCARDIOGRAM TRACING: CPT | Performed by: EMERGENCY MEDICINE

## 2024-12-28 PROCEDURE — 36415 COLL VENOUS BLD VENIPUNCTURE: CPT

## 2024-12-28 PROCEDURE — 71045 X-RAY EXAM CHEST 1 VIEW: CPT

## 2024-12-28 PROCEDURE — 99285 EMERGENCY DEPT VISIT HI MDM: CPT

## 2024-12-28 PROCEDURE — 82948 REAGENT STRIP/BLOOD GLUCOSE: CPT | Performed by: EMERGENCY MEDICINE

## 2024-12-28 PROCEDURE — 83880 ASSAY OF NATRIURETIC PEPTIDE: CPT | Performed by: FAMILY MEDICINE

## 2024-12-28 PROCEDURE — 82077 ASSAY SPEC XCP UR&BREATH IA: CPT | Performed by: FAMILY MEDICINE

## 2024-12-28 PROCEDURE — 70498 CT ANGIOGRAPHY NECK: CPT

## 2024-12-28 PROCEDURE — 85730 THROMBOPLASTIN TIME PARTIAL: CPT | Performed by: EMERGENCY MEDICINE

## 2024-12-28 PROCEDURE — 70450 CT HEAD/BRAIN W/O DYE: CPT

## 2024-12-28 PROCEDURE — G0378 HOSPITAL OBSERVATION PER HR: HCPCS

## 2024-12-28 PROCEDURE — 0042T HC CT CEREBRAL PERFUSION W/WO CONTRAST: CPT

## 2024-12-28 PROCEDURE — 85610 PROTHROMBIN TIME: CPT | Performed by: EMERGENCY MEDICINE

## 2024-12-28 PROCEDURE — 85025 COMPLETE CBC W/AUTO DIFF WBC: CPT | Performed by: EMERGENCY MEDICINE

## 2024-12-28 PROCEDURE — 25510000001 IOPAMIDOL PER 1 ML: Performed by: EMERGENCY MEDICINE

## 2024-12-28 PROCEDURE — 80053 COMPREHEN METABOLIC PANEL: CPT | Performed by: EMERGENCY MEDICINE

## 2024-12-28 PROCEDURE — 86850 RBC ANTIBODY SCREEN: CPT | Performed by: EMERGENCY MEDICINE

## 2024-12-28 PROCEDURE — 86900 BLOOD TYPING SEROLOGIC ABO: CPT | Performed by: EMERGENCY MEDICINE

## 2024-12-28 PROCEDURE — 70496 CT ANGIOGRAPHY HEAD: CPT

## 2024-12-28 PROCEDURE — 86901 BLOOD TYPING SEROLOGIC RH(D): CPT | Performed by: EMERGENCY MEDICINE

## 2024-12-28 PROCEDURE — 83036 HEMOGLOBIN GLYCOSYLATED A1C: CPT | Performed by: FAMILY MEDICINE

## 2024-12-28 PROCEDURE — 84484 ASSAY OF TROPONIN QUANT: CPT | Performed by: FAMILY MEDICINE

## 2024-12-28 RX ORDER — IOPAMIDOL 755 MG/ML
150 INJECTION, SOLUTION INTRAVASCULAR
Status: COMPLETED | OUTPATIENT
Start: 2024-12-28 | End: 2024-12-28

## 2024-12-28 RX ORDER — CLOPIDOGREL BISULFATE 75 MG/1
300 TABLET ORAL ONCE
Status: COMPLETED | OUTPATIENT
Start: 2024-12-29 | End: 2024-12-29

## 2024-12-28 RX ORDER — POTASSIUM CHLORIDE 750 MG/1
30 CAPSULE, EXTENDED RELEASE ORAL ONCE
Status: COMPLETED | OUTPATIENT
Start: 2024-12-29 | End: 2024-12-29

## 2024-12-28 RX ORDER — SODIUM CHLORIDE 0.9 % (FLUSH) 0.9 %
10 SYRINGE (ML) INJECTION AS NEEDED
Status: DISCONTINUED | OUTPATIENT
Start: 2024-12-28 | End: 2024-12-30 | Stop reason: HOSPADM

## 2024-12-28 RX ORDER — ASPIRIN 325 MG
325 TABLET ORAL ONCE
Status: COMPLETED | OUTPATIENT
Start: 2024-12-29 | End: 2024-12-29

## 2024-12-28 RX ADMIN — IOPAMIDOL 150 ML: 755 INJECTION, SOLUTION INTRAVENOUS at 23:15

## 2024-12-29 ENCOUNTER — APPOINTMENT (OUTPATIENT)
Dept: MRI IMAGING | Facility: HOSPITAL | Age: 44
End: 2024-12-29
Payer: MEDICAID

## 2024-12-29 ENCOUNTER — APPOINTMENT (OUTPATIENT)
Dept: CARDIOLOGY | Facility: HOSPITAL | Age: 44
End: 2024-12-29
Payer: MEDICAID

## 2024-12-29 LAB
CHOLEST SERPL-MCNC: 195 MG/DL (ref 0–200)
ETHANOL BLD-MCNC: <10 MG/DL (ref 0–10)
ETHANOL UR QL: <0.01 %
GEN 5 1HR TROPONIN T REFLEX: 8 NG/L
GLUCOSE BLDC GLUCOMTR-MCNC: 122 MG/DL (ref 70–99)
GLUCOSE BLDC GLUCOMTR-MCNC: 151 MG/DL (ref 70–99)
GLUCOSE BLDC GLUCOMTR-MCNC: 193 MG/DL (ref 70–99)
HBA1C MFR BLD: 6.1 % (ref 4.8–5.6)
HDLC SERPL-MCNC: 109 MG/DL (ref 40–60)
LDLC SERPL CALC-MCNC: 64 MG/DL (ref 0–100)
LDLC/HDLC SERPL: 0.55 {RATIO}
NT-PROBNP SERPL-MCNC: <36 PG/ML (ref 0–450)
TRIGL SERPL-MCNC: 132 MG/DL (ref 0–150)
TROPONIN T NUMERIC DELTA: -2 NG/L
TROPONIN T SERPL HS-MCNC: 10 NG/L
VLDLC SERPL-MCNC: 22 MG/DL (ref 5–40)

## 2024-12-29 PROCEDURE — 63710000001 INSULIN LISPRO (HUMAN) PER 5 UNITS: Performed by: FAMILY MEDICINE

## 2024-12-29 PROCEDURE — 25010000002 ENOXAPARIN PER 10 MG: Performed by: FAMILY MEDICINE

## 2024-12-29 PROCEDURE — G0378 HOSPITAL OBSERVATION PER HR: HCPCS

## 2024-12-29 PROCEDURE — 82948 REAGENT STRIP/BLOOD GLUCOSE: CPT | Performed by: FAMILY MEDICINE

## 2024-12-29 PROCEDURE — 36415 COLL VENOUS BLD VENIPUNCTURE: CPT

## 2024-12-29 PROCEDURE — 82948 REAGENT STRIP/BLOOD GLUCOSE: CPT

## 2024-12-29 PROCEDURE — 80061 LIPID PANEL: CPT | Performed by: FAMILY MEDICINE

## 2024-12-29 PROCEDURE — 84484 ASSAY OF TROPONIN QUANT: CPT | Performed by: FAMILY MEDICINE

## 2024-12-29 PROCEDURE — 96372 THER/PROPH/DIAG INJ SC/IM: CPT

## 2024-12-29 PROCEDURE — 70551 MRI BRAIN STEM W/O DYE: CPT

## 2024-12-29 PROCEDURE — 99214 OFFICE O/P EST MOD 30 MIN: CPT | Performed by: PSYCHIATRY & NEUROLOGY

## 2024-12-29 PROCEDURE — 93306 TTE W/DOPPLER COMPLETE: CPT

## 2024-12-29 RX ORDER — INSULIN LISPRO 100 [IU]/ML
2-9 INJECTION, SOLUTION INTRAVENOUS; SUBCUTANEOUS
Status: DISCONTINUED | OUTPATIENT
Start: 2024-12-29 | End: 2024-12-29

## 2024-12-29 RX ORDER — ONDANSETRON 2 MG/ML
4 INJECTION INTRAMUSCULAR; INTRAVENOUS EVERY 6 HOURS PRN
Status: DISCONTINUED | OUTPATIENT
Start: 2024-12-29 | End: 2024-12-30 | Stop reason: HOSPADM

## 2024-12-29 RX ORDER — DEXTROSE MONOHYDRATE 25 G/50ML
25 INJECTION, SOLUTION INTRAVENOUS
Status: DISCONTINUED | OUTPATIENT
Start: 2024-12-29 | End: 2024-12-30 | Stop reason: HOSPADM

## 2024-12-29 RX ORDER — ASPIRIN 81 MG/1
81 TABLET, CHEWABLE ORAL DAILY
Status: DISCONTINUED | OUTPATIENT
Start: 2024-12-30 | End: 2024-12-30 | Stop reason: HOSPADM

## 2024-12-29 RX ORDER — BISACODYL 10 MG
10 SUPPOSITORY, RECTAL RECTAL DAILY PRN
Status: DISCONTINUED | OUTPATIENT
Start: 2024-12-29 | End: 2024-12-30 | Stop reason: HOSPADM

## 2024-12-29 RX ORDER — BISACODYL 5 MG/1
5 TABLET, DELAYED RELEASE ORAL DAILY PRN
Status: DISCONTINUED | OUTPATIENT
Start: 2024-12-29 | End: 2024-12-30 | Stop reason: HOSPADM

## 2024-12-29 RX ORDER — SODIUM CHLORIDE 9 MG/ML
40 INJECTION, SOLUTION INTRAVENOUS AS NEEDED
Status: DISCONTINUED | OUTPATIENT
Start: 2024-12-29 | End: 2024-12-30 | Stop reason: HOSPADM

## 2024-12-29 RX ORDER — SODIUM CHLORIDE 0.9 % (FLUSH) 0.9 %
10 SYRINGE (ML) INJECTION AS NEEDED
Status: DISCONTINUED | OUTPATIENT
Start: 2024-12-29 | End: 2024-12-30 | Stop reason: HOSPADM

## 2024-12-29 RX ORDER — SODIUM CHLORIDE 0.9 % (FLUSH) 0.9 %
10 SYRINGE (ML) INJECTION EVERY 12 HOURS SCHEDULED
Status: DISCONTINUED | OUTPATIENT
Start: 2024-12-29 | End: 2024-12-30 | Stop reason: HOSPADM

## 2024-12-29 RX ORDER — AMOXICILLIN 250 MG
2 CAPSULE ORAL 2 TIMES DAILY PRN
Status: DISCONTINUED | OUTPATIENT
Start: 2024-12-29 | End: 2024-12-30 | Stop reason: HOSPADM

## 2024-12-29 RX ORDER — IBUPROFEN 600 MG/1
1 TABLET ORAL
Status: DISCONTINUED | OUTPATIENT
Start: 2024-12-29 | End: 2024-12-30 | Stop reason: HOSPADM

## 2024-12-29 RX ORDER — ATORVASTATIN CALCIUM 40 MG/1
80 TABLET, FILM COATED ORAL NIGHTLY
Status: DISCONTINUED | OUTPATIENT
Start: 2024-12-29 | End: 2024-12-30 | Stop reason: HOSPADM

## 2024-12-29 RX ORDER — NICOTINE POLACRILEX 4 MG
15 LOZENGE BUCCAL
Status: DISCONTINUED | OUTPATIENT
Start: 2024-12-29 | End: 2024-12-30 | Stop reason: HOSPADM

## 2024-12-29 RX ORDER — ENOXAPARIN SODIUM 100 MG/ML
40 INJECTION SUBCUTANEOUS DAILY
Status: DISCONTINUED | OUTPATIENT
Start: 2024-12-29 | End: 2024-12-30 | Stop reason: HOSPADM

## 2024-12-29 RX ORDER — POLYETHYLENE GLYCOL 3350 17 G/17G
17 POWDER, FOR SOLUTION ORAL DAILY PRN
Status: DISCONTINUED | OUTPATIENT
Start: 2024-12-29 | End: 2024-12-30 | Stop reason: HOSPADM

## 2024-12-29 RX ORDER — ASPIRIN 300 MG/1
300 SUPPOSITORY RECTAL DAILY
Status: DISCONTINUED | OUTPATIENT
Start: 2024-12-30 | End: 2024-12-29

## 2024-12-29 RX ADMIN — INSULIN LISPRO 2 UNITS: 100 INJECTION, SOLUTION INTRAVENOUS; SUBCUTANEOUS at 13:14

## 2024-12-29 RX ADMIN — ATORVASTATIN CALCIUM 80 MG: 40 TABLET, FILM COATED ORAL at 20:38

## 2024-12-29 RX ADMIN — CLOPIDOGREL BISULFATE 300 MG: 75 TABLET ORAL at 00:02

## 2024-12-29 RX ADMIN — INSULIN LISPRO 2 UNITS: 100 INJECTION, SOLUTION INTRAVENOUS; SUBCUTANEOUS at 08:26

## 2024-12-29 RX ADMIN — Medication 10 ML: at 20:38

## 2024-12-29 RX ADMIN — POTASSIUM CHLORIDE 30 MEQ: 750 CAPSULE, EXTENDED RELEASE ORAL at 00:02

## 2024-12-29 RX ADMIN — Medication 10 ML: at 08:27

## 2024-12-29 RX ADMIN — ENOXAPARIN SODIUM 40 MG: 100 INJECTION SUBCUTANEOUS at 08:26

## 2024-12-29 RX ADMIN — ASPIRIN 325 MG: 325 TABLET ORAL at 00:02

## 2024-12-29 NOTE — H&P
[facility]   HISTORY AND PHYSICAL    Patient Name: Nessa Brown  : 1980  MRN: [id number]  Primary Care Physician:  Teresita Mcbride MD  Date of admission: 2024    Subjective   Subjective     Chief Complaint: Left-sided weakness, paresthesia    HPI:    Nessa Brown is a 44 y.o. male with past medical history of diabetes, hypertension, hyperlipidemia, and GERD presenting to the ED for evaluation of left-sided weakness and left facial numbness.  Patient states that he drank heavily for 3 drink straight until 2 days ago and this afternoon around 3 PM he had sudden onset dizziness with blurred vision, and lightheadedness.  Symptoms resolved but then returned again later in the evening but much more severe and accompanied with left-sided weakness, left-sided numbness, palpitations, bilateral arm paresthesias, diaphoresis, and nausea.  Due to concerns he was brought immediately to the ED for further evaluation.  Patient denied any history of prior CVA or MI.  In the ED patient's vitals were all within normal limits on arrival.  Labs were all relatively unremarkable given his chronic conditions including a normal WBC, troponin, BNP and lactic acid.  EKG shows sinus rhythm with flattened T waves in the inferior leads but no significant ST changes.  Chest x-ray was negative for any acute findings.  CT of the head was negative for any acute intracranial abnormalities.  CTA of the head and neck was negative for any stenosis and perfusion study was unremarkable as well.  When seen patient symptoms had mostly resolved and only had some left arm paresthesia still present.  He denied any recent fevers, headaches, focal weakness, chest pain, palpitation, shortness of breath, cough, abdominal pain, vomiting, diarrhea, constipation, dysuria, hematuria, hematochezia, melena, or anxiety.  Patient admitted for further evaluation and treatment.    Review of Systems   All systems  were reviewed and negative except for: As per HPI    Personal History     Past Medical History:   Diagnosis Date    Hyperlipidemia     Hypertension        Past Surgical History:   Procedure Laterality Date    THROAT SURGERY         Family History: family history is not on file. Otherwise pertinent FHx was reviewed and not pertinent to current issue.    Social History:  reports that he has never smoked. He has never used smokeless tobacco. He reports current alcohol use. He reports that he does not use drugs.    Home Medications:  amLODIPine, atorvastatin, lisinopril-hydrochlorothiazide, and metFORMIN ER      Allergies:  No Known Allergies    Objective   Objective     Vitals:   Temp:  [98.2 °F (36.8 °C)] 98.2 °F (36.8 °C)  Heart Rate:  [] 71  Resp:  [16-18] 16  BP: (109-152)/(87-96) 109/93  Physical Exam    Constitutional: Awake, alert   Eyes: PERRLA, sclerae anicteric, no conjunctival injection   HENT: NCAT, mucous membranes moist   Neck: Supple, no thyromegaly, no lymphadenopathy, trachea midline   Respiratory: Clear to auscultation bilaterally, nonlabored respirations    Cardiovascular: RRR, no murmurs, rubs, or gallops, palpable pedal pulses bilaterally   Gastrointestinal: Positive bowel sounds, soft, nontender, nondistended   Musculoskeletal: No bilateral ankle edema, no clubbing or cyanosis to extremities   Psychiatric: Appropriate affect, cooperative   Neurologic: Oriented x 3, strength symmetric in all extremities, Cranial Nerves grossly intact to confrontation, speech clear   Skin: No rashes     Result Review    Result Review:  I have personally reviewed the results from the time of this admission to 12/29/2024 01:36 EST and agree with these findings:  [x]  Laboratory list / accordion  []  Microbiology  [x]  Radiology  [x]  EKG/Telemetry   []  Cardiology/Vascular   []  Pathology  []  Old records  []  Other:  Most notable findings include: No leukocytosis, EKG with sinus rhythm and abnormal T waves but  no ST changes, chest x-ray negative, CT of the head negative for any acute finding, CTA of the head and neck negative for any significant stenosis, perfusion study unremarkable      Assessment & Plan   Assessment / Plan     Brief Patient Summary:  Nessa Brown is a 44 y.o. male with past medical history of diabetes, hypertension, hyperlipidemia, and GERD presenting to the ED for evaluation of left-sided weakness and left facial numbness.    Active Hospital Problems:  Active Hospital Problems    Diagnosis     **TIA (transient ischemic attack)     Prediabetes     Primary hypertension     Pure hypercholesterolemia     Alcohol use disorder in remission      Plan:     TIA/CVA Rule out  -Admit to telemetry   -Symptoms resolved when seen  -Admits to recent heavy alcohol use.  Last drink 2 days ago  -CT of the head negative for any acute findings  -CT of the head and neck negative for any significant stenosis  -MRI ordered and pending  -Neurochecks  -Bedside swallow, diet if passed  -Echo with bubble study  -Neurology consulted  -Supportive care    Diabetes  -Insulin sliding scale  -Levemir at bedtime  -Titrate as needed    HTN  -Currently well controlled  -PRN BP meds  -Resume home meds when available  -Titrate if needed    Alcohol use    GI ppx  DVT ppx    VTE Prophylaxis:  Pharmacologic VTE prophylaxis orders are signed & held.          CODE STATUS: Full code     Admission Status:  I believe this patient meets observation status.      Electronically signed by Dhruv Saleh MD, 12/29/24, 1:36 AM EST.

## 2024-12-29 NOTE — PLAN OF CARE
Goal Outcome Evaluation:              Outcome Evaluation: Pt came up from ED at 0530. Pt is waiting on MRI. VSS. NIH is 0.  needed.

## 2024-12-29 NOTE — PLAN OF CARE
Goal Outcome Evaluation:  Plan of Care Reviewed With: patient        Progress: no change  Outcome Evaluation: CIWA initiated. score 0. no acute changes.

## 2024-12-29 NOTE — CONSULTS
[facility]      Patient Name:   Nessa Brown  YOB: 1980  Identification Number:   MRN - [id number]  Date of Service:   12/28/2024 22:35:29    Diagnosis:        I63.89 - Cerebrovascular accident (CVA) due to other mechanism ([facility])    Impression:       Nessa Brown is a 43 yo F w/ a hx of HTN and HLD who presents with left sided numbness. Exam is notable for mild left hemisensory disturbance. No weakness or ataxia. Head CT showed no acute intracranial pathology. No LVO on CTA. Presentation is concerning for ischemic stroke. No indication for thrombolytics given nondisabling deficits. Recommend the following:   -  mg now, transition to 81 mg daily   - Plavix 300 mg now, transition to 75 mg daily   - Permissive HTN (SBP <200) until 2140   - Bedside swallow eval   - MRI brain w/o contrast   -Telemetry   -TTE   -hemoglobin A1c, lipid panel   - PT/OT/SLP   -Neurology to follow       Our recommendations are outlined below.    Recommendations:          Stroke/Telemetry Floor        Neuro Checks        Bedside Swallow Eval        DVT Prophylaxis        IV Fluids, Normal Saline        Head of Bed 30 Degrees        Euglycemia and Avoid Hyperthermia (PRN Acetaminophen)    Sign Out:        Discussed with Emergency Department Provider        ------------------------------------------------------------------------------    Advanced Imaging:  CTA Head and Neck Completed.    LVO:No    Patient in not a candidate for VIRAJ      Metrics:  Last Known Well: 12/28/2024 21:40:00  Dispatch Time: 12/28/2024 22:35:13  Arrival Time: 12/28/2024 22:10:00  Initial Response Time: 12/28/2024 22:42:51  Symptoms: left sided numbness.  Initial patient interaction: 12/28/2024 22:52:00  NIHSS Assessment Completed: 12/28/2024 22:55:00  Patient is not a candidate for Thrombolytic.  Thrombolytic Medical Decision: 12/28/2024 22:59:00  Patient was not deemed candidate for Thrombolytic  because of following reasons:  Stroke severity too mild (non-disabling) .    CT head showed no acute hemorrhage or acute core infarct.    Primary Provider Notified of Diagnostic Impression and Management Plan on: 12/28/2024 23:40:18        ------------------------------------------------------------------------------    History of Present Illness:  Patient is a 44 year old Male.    Patient was brought by private transportation with symptoms of left sided numbness.  Nessa Brown is a 43 yo F w/ a hx of HTN and HLD who presents with left sided numbness. LKN at 2140. Around that time, developed numbness in left face, arm, and leg. Also reports blurry vision in the left eye. Denies any headache. Denies similar symptoms in the past.        Past Medical History:       Hypertension       Hyperlipidemia    Medications:    No Anticoagulant use   No Antiplatelet use  Reviewed EMR for current medications    Allergies:   Reviewed    Social History:  Smoking: Former  Alcohol Use: No  Drug Use: No    Family History:    There is no family history of premature cerebrovascular disease pertinent to this consultation    ROS :  14 Points Review of Systems was performed and was negative except mentioned in HPI.    Past Surgical History:  There Is No Surgical History Contributory To Today’s Visit        Examination:  BP(152/96), Pulse(109), Blood Glucose(145)  1A: Level of Consciousness - Alert; keenly responsive + 0  1B: Ask Month and Age - Both Questions Right + 0  1C: Blink Eyes & Squeeze Hands - Performs Both Tasks + 0  2: Test Horizontal Extraocular Movements - Normal + 0  3: Test Visual Fields - No Visual Loss + 0  4: Test Facial Palsy (Use Grimace if Obtunded) - Normal symmetry + 0  5A: Test Left Arm Motor Drift - No Drift for 10 Seconds + 0  5B: Test Right Arm Motor Drift - No Drift for 10 Seconds + 0  6A: Test Left Leg Motor Drift - No Drift for 5 Seconds + 0  6B: Test Right Leg Motor Drift - No Drift for 5 Seconds + 0  7:  Test Limb Ataxia (FNF/Heel-Shin) - No Ataxia + 0  8: Test Sensation - Mild-Moderate Loss: Less Sharp/More Dull + 1  9: Test Language/Aphasia - Normal; No aphasia + 0  10: Test Dysarthria - Normal + 0  11: Test Extinction/Inattention - No abnormality + 0    NIHSS Score: 1      Pre-Morbid Modified Moran Scale:  0 Points = No symptoms at all    Spoke with : ED Provider  I reviewed the available imaging via Rapid and initiated discussion with the primary provider    This consult was conducted in real time using interactive audio and video technology. Patient was informed of the technology being used for this visit and agreed to proceed. Patient located in hospital and provider located at home/office setting.      Patient is being evaluated for possible acute neurologic impairment and high probability of imminent or life-threatening deterioration. I spent total of 31 minutes providing care to this patient, including time for face to face visit via telemedicine, review of medical records, imaging studies and discussion of findings with providers, the patient and/or family.      Dr Jayson Mcclain      [facility]  For Inpatient follow-up with [facility] physician please call [facility] at [phone]. As we are not an outpatient service for any post hospital discharge needs please contact the hospital for assistance.  If you have any questions for the [facility] physicians or need to reconsult for clinical or diagnostic changes please contact us via [facility] at [phone].

## 2024-12-29 NOTE — CONSULTS
Primary Care Provider: No ref. provider found     Consult requested by: Admitting team    Reason for Consultation: Neurological evaluation /    History taken from: patient chart family RN    Chief complaint: Not feeling well       SUBJECTIVE:    History of present illness: Background per H&P: Nessa Brown is a 44 y.o. male who was evaluated in room 205 bed 1 at [facility]    Source of information is the patient via his friend that is available in the room, he understands some English but does not speak much    This gentleman reports that he started drinking heavily around [place] and then for several days did not drink and yesterday was feeling strange.  There is question whether he had any focal signs or symptoms    He is doing great    His workup was unremarkable    No seizures    No headaches or migraines    All symptoms have resolved        Vital signs stable    Random glucose 154  CT head, CTA and CTP were unremarkable        As per admitting,  Nessa Brown is a 44 y.o. male with past medical history of diabetes, hypertension, hyperlipidemia, and GERD presenting to the ED for evaluation of left-sided weakness and left facial numbness.  Patient states that he drank heavily for 3 drink straight until 2 days ago and this afternoon around 3 PM he had sudden onset dizziness with blurred vision, and lightheadedness.  Symptoms resolved but then returned again later in the evening but much more severe and accompanied with left-sided weakness, left-sided numbness, palpitations, bilateral arm paresthesias, diaphoresis, and nausea.  Due to concerns he was brought immediately to the ED for further evaluation.  Patient denied any history of prior CVA or MI.  In the ED patient's vitals were all within normal limits on arrival.  Labs were all relatively unremarkable given his chronic conditions including a normal WBC, troponin, BNP and lactic acid.  EKG shows sinus rhythm with  flattened T waves in the inferior leads but no significant ST changes.  Chest x-ray was negative for any acute findings.  CT of the head was negative for any acute intracranial abnormalities.  CTA of the head and neck was negative for any stenosis and perfusion study was unremarkable as well.  When seen patient symptoms had mostly resolved and only had some left arm paresthesia still present.  He denied any recent fevers, headaches, focal weakness, chest pain, palpitation, shortness of breath, cough, abdominal pain, vomiting, diarrhea, constipation, dysuria, hematuria, hematochezia, melena, or anxiety.  Patient admitted for further evaluation and treatment.     Review of Systems              All systems were reviewed and negative except for: As per HPI           As per [facility]        Patient Name:   Nessa Brown  YOB: 1980  Identification Number:   MRN - [id number]  Date of Service:   12/28/2024 22:35:29     Diagnosis:        I63.89 - Cerebrovascular accident (CVA) due to other mechanism ([facility])     Impression:       Nessa Brown is a 45 yo F w/ a hx of HTN and HLD who presents with left sided numbness. Exam is notable for mild left hemisensory disturbance. No weakness or ataxia. Head CT showed no acute intracranial pathology. No LVO on CTA. Presentation is concerning for ischemic stroke. No indication for thrombolytics given nondisabling deficits. Recommend the following:   -  mg now, transition to 81 mg daily   - Plavix 300 mg now, transition to 75 mg daily   - Permissive HTN (SBP <200) until 2140   - Bedside swallow eval   - MRI brain w/o contrast   -Telemetry   -TTE   -hemoglobin A1c, lipid panel   - PT/OT/SLP   -Neurology to follow        Our recommendations are outlined below.     Recommendations:           Stroke/Telemetry Floor        Neuro Checks        Bedside Swallow Eval        DVT Prophylaxis        IV Fluids, Normal Saline         Head of Bed 30 Degrees        Euglycemia and Avoid Hyperthermia (PRN Acetaminophen)     Sign Out:        Discussed with Emergency Department Provider           ------------------------------------------------------------------------------     Advanced Imaging:  CTA Head and Neck Completed.     LVO:No     Patient in not a candidate for VIRAJ        Metrics:  Last Known Well: 12/28/2024 21:40:00  Dispatch Time: 12/28/2024 22:35:13  Arrival Time: 12/28/2024 22:10:00  Initial Response Time: 12/28/2024 22:42:51  Symptoms: left sided numbness.  Initial patient interaction: 12/28/2024 22:52:00  NIHSS Assessment Completed: 12/28/2024 22:55:00  Patient is not a candidate for Thrombolytic.  Thrombolytic Medical Decision: 12/28/2024 22:59:00  Patient was not deemed candidate for Thrombolytic because of following reasons:  Stroke severity too mild (non-disabling) .     CT head showed no acute hemorrhage or acute core infarct.     Primary Provider Notified of Diagnostic Impression and Management Plan on: 12/28/2024 23:40:18           ------------------------------------------------------------------------------     History of Present Illness:  Patient is a 44 year old Male.     Patient was brought by private transportation with symptoms of left sided numbness.  Nessa Brown is a 45 yo F w/ a hx of HTN and HLD who presents with left sided numbness. LKN at 2140. Around that time, developed numbness in left face, arm, and leg. Also reports blurry vision in the left eye. Denies any headache. Denies similar symptoms in the past.           Past Medical History:       Hypertension       Hyperlipidemia     Medications:     No Anticoagulant use   No Antiplatelet use  Reviewed EMR for current medications     Allergies:   Reviewed     Social History:  Smoking: Former  Alcohol Use: No  Drug Use: No     Family History:     There is no family history of premature cerebrovascular disease pertinent to this consultation     ROS :  14 Points  Review of Systems was performed and was negative except mentioned in HPI.     Past Surgical History:  There Is No Surgical History Contributory To Today’s Visit           Examination:  BP(152/96), Pulse(109), Blood Glucose(145)  1A: Level of Consciousness - Alert; keenly responsive + 0  1B: Ask Month and Age - Both Questions Right + 0  1C: Blink Eyes & Squeeze Hands - Performs Both Tasks + 0  2: Test Horizontal Extraocular Movements - Normal + 0  3: Test Visual Fields - No Visual Loss + 0  4: Test Facial Palsy (Use Grimace if Obtunded) - Normal symmetry + 0  5A: Test Left Arm Motor Drift - No Drift for 10 Seconds + 0  5B: Test Right Arm Motor Drift - No Drift for 10 Seconds + 0  6A: Test Left Leg Motor Drift - No Drift for 5 Seconds + 0  6B: Test Right Leg Motor Drift - No Drift for 5 Seconds + 0  7: Test Limb Ataxia (FNF/Heel-Shin) - No Ataxia + 0  8: Test Sensation - Mild-Moderate Loss: Less Sharp/More Dull + 1  9: Test Language/Aphasia - Normal; No aphasia + 0  10: Test Dysarthria - Normal + 0  11: Test Extinction/Inattention - No abnormality + 0     NIHSS Score: 1        Pre-Morbid Modified Harper Scale:  0 Points = No symptoms at all     Spoke with : ED Provider  I reviewed the available imaging via Rapid and initiated discussion with the primary provider     This consult was conducted in real time using interactive audio and video technology. Patient was informed of the technology being used for this visit and agreed to proceed. Patient located in hospital and provider located at home/office setting.        Patient is being evaluated for possible acute neurologic impairment and high probability of imminent or life-threatening deterioration. I spent total of 31 minutes providing care to this patient, including time for face to face visit via telemedicine, review of medical records, imaging studies and discussion of findings with providers, the patient and/or family.        Dr Jayson Mcclain        - Portions of  the above HPI were copied from previous encounters and edited as appropriate. PMH as detailed below.     Review of Systems   No fever chills rigors or sweats  No weight issues  No sleep problems  HEENT:  No speech problem, vision changes, facial asymmetry or pain, or neck problem  Chest: No chest pain, clubbing, cyanosis, orthopnea palpitations  Pulmonary:  No shortness of air, cough or expectoration  Abdomen:  No swelling/tension, constipation,diarrhea or pain  No genitourinary symptoms  Extremity problems as discussed  No back problem  No psychotic issues  Neurologic issues as discussed  No hematologic, dermatologic or endocrine problems        PATIENT HISTORY:  Past Medical History:   Diagnosis Date    Hyperlipidemia     Hypertension    ,   Past Surgical History:   Procedure Laterality Date    THROAT SURGERY     , History reviewed. No pertinent family history.,   Social History     Tobacco Use    Smoking status: Never    Smokeless tobacco: Never   Vaping Use    Vaping status: Never Used   Substance Use Topics    Alcohol use: Yes    Drug use: Never   ,   Prior to Admission medications    Medication Sig Start Date End Date Taking? Authorizing Provider   amLODIPine (NORVASC) 10 MG tablet Take 1 tablet by mouth Daily. 10/14/24  Yes Teresita Mcbride MD   atorvastatin (LIPITOR) 40 MG tablet Take 1 tablet by mouth Daily. 8/26/24  Yes Teresita Mcbride MD   lisinopril-hydrochlorothiazide (Zestoretic) 20-12.5 MG per tablet Take 2 tablets by mouth Daily. 8/26/24  Yes Teresita Mcbride MD   metFORMIN ER (GLUCOPHAGE-XR) 500 MG 24 hr tablet Take 1 tablet by mouth 2 (Two) Times a Day. 10/14/24  Yes Teresita Mcbride MD   glucose blood test strip Check BS BID or prn 1/8/24 12/29/24  Teresita Mcbride MD   glucose monitor monitoring kit Use 1 each As Needed (check BS BID or prn). 1/8/24 12/29/24  Teresita Mcbride MD   Lancets misc Check  BS q d  or prn 1/8/24 12/29/24  Teresita Mcbride MD    Allergies:  Patient has no known allergies.    Current Facility-Administered Medications   Medication Dose Route Frequency Provider Last Rate Last Admin    [START ON 12/30/2024] aspirin chewable tablet 81 mg  81 mg Oral Daily Dhruv Saleh MD        Or    [START ON 12/30/2024] aspirin suppository 300 mg  300 mg Rectal Daily Dhruv Saleh MD        atorvastatin (LIPITOR) tablet 80 mg  80 mg Oral Nightly Dhruv Saleh MD        sennosides-docusate (PERICOLACE) 8.6-50 MG per tablet 2 tablet  2 tablet Oral BID PRN Dhruv Saleh MD        And    polyethylene glycol (MIRALAX) packet 17 g  17 g Oral Daily PRN Dhruv Saleh MD        And    bisacodyl (DULCOLAX) EC tablet 5 mg  5 mg Oral Daily PRN Dhruv Saleh MD        And    bisacodyl (DULCOLAX) suppository 10 mg  10 mg Rectal Daily PRN Dhruv Saleh MD        dextrose (D50W) (25 g/50 mL) IV injection 25 g  25 g Intravenous Q15 Min PRN Dhruv Saleh MD        dextrose (GLUTOSE) oral gel 15 g  15 g Oral Q15 Min PRN Dhruv Saleh MD        Enoxaparin Sodium (LOVENOX) syringe 40 mg  40 mg Subcutaneous Daily Dhruv Saleh MD   40 mg at 12/29/24 0826    glucagon (GLUCAGEN) injection 1 mg  1 mg Intramuscular Q15 Min PRN Dhruv Saleh MD        Insulin Lispro (humaLOG) injection 2-9 Units  2-9 Units Subcutaneous 4x Daily AC & at Bedtime Dhruv Saleh MD   2 Units at 12/29/24 0826    ondansetron (ZOFRAN) injection 4 mg  4 mg Intravenous Q6H PRN Dhruv Saleh MD        sodium chloride 0.9 % flush 10 mL  10 mL Intravenous PRN Dmitri Ruiz MD        sodium chloride 0.9 % flush 10 mL  10 mL Intravenous Q12H Dhruv Saleh MD   10 mL at 12/29/24 0827    sodium chloride 0.9 % flush 10 mL  10 mL Intravenous PRN Dhruv Saleh MD        sodium chloride 0.9 % infusion 40 mL  40 mL Intravenous PRN Dhruv Saleh MD             ________________________________________________________        OBJECTIVE:  Upon today's exam, the patient is resting comfortably in bed in no acute distress      The patient is awake and alert and oriented x3  Normal speech, correcting for language barrier to some degree     Cranial nerve examination demonstrate:  Full fields of vision to confrontation  Pupils are round, reactive to light and accommodation and size of about 3 mm  No ptosis or nystagmus  Funduscopic examination was not successful  Eye movements are conjugate     Sensation on the face and scalp are normal  Muscles of mastication are normal and symmetric  Muscles of  facial expression are normal and symmetric  Hearing is intact bilaterally  Head turning and shoulder shrugs were unremarkable  Tongue was midline  I could not visualize  oropharynx or uvula     Motor examination:  Normal bulk, tone and strength was 5/5  No fasciculations     Sensory examination:  Intact for soft touch, pain   Romberg was not evaluated     Reflexes:  0/4     Coordination:  Normal finger-to-nose to finger, rapid alternating movements and toe to finger     Gait:  Deferred     Toe signs:  Mute      NIH Stroke Scale  Interval: baseline  1a. Level of Consciousness: 0-->Alert, keenly responsive  1b. LOC Questions: 0-->Answers both questions correctly  1c. LOC Commands: 0-->Performs both tasks correctly  2. Best Gaze: 0-->Normal  3. Visual: 0-->No visual loss  4. Facial Palsy: 0-->Normal symmetrical movements  5a. Motor Arm, Left: 0-->No drift, limb holds 90 (or 45) degrees for full 10 secs  5b. Motor Arm, Right: 0-->No drift, limb holds 90 (or 45) degrees for full 10 secs  6a. Motor Leg, Left: 0-->No drift, leg holds 30 degree position for full 5 secs  6b. Motor Leg, Right: 0-->No drift, leg holds 30 degree position for full 5 secs  7. Limb Ataxia: 0-->Absent  8. Sensory: 0-->Normal, no sensory loss  9. Best Language: 0-->No aphasia, normal  10. Dysarthria: 0-->Normal  11.  Extinction and Inattention (formerly Neglect): 0-->No abnormality  Total (NIH Stroke Scale): 0         ________________________________________________________   RESULTS REVIEW:    VITAL SIGNS:   Temp:  [97.7 °F (36.5 °C)-98.2 °F (36.8 °C)] 97.9 °F (36.6 °C)  Heart Rate:  [] 64  Resp:  [16-18] 18  BP: (109-154)/(85-99) 117/85     LABS:      Lab 12/28/24 2239   WBC 7.52   HEMOGLOBIN 14.1   HEMATOCRIT 41.8   PLATELETS 282   NEUTROS ABS 4.40   IMMATURE GRANS (ABS) 0.02   LYMPHS ABS 2.30   MONOS ABS 0.62   EOS ABS 0.11   MCV 85.0   PROTIME 12.3   APTT 25.1         Lab 12/28/24 2239   SODIUM 140   POTASSIUM 3.3*   CHLORIDE 98   CO2 27.1   ANION GAP 14.9   BUN 19   CREATININE 1.15   EGFR 80.5   GLUCOSE 154*   CALCIUM 9.9         Lab 12/28/24 2239   TOTAL PROTEIN 8.1   ALBUMIN 5.1   GLOBULIN 3.0   ALT (SGPT) 43*   AST (SGOT) 43*   BILIRUBIN 0.4   ALK PHOS 56         Lab 12/29/24 0224 12/28/24 2239   PROBNP  --  <36.0   HSTROP T 8 10   PROTIME  --  12.3   INR  --  0.89         Lab 12/29/24 0224   CHOLESTEROL 195   LDL CHOL 64   HDL CHOL 109*   TRIGLYCERIDES 132         Lab 12/28/24 2239   ABO TYPING A   RH TYPING Positive   ANTIBODY SCREEN Negative             Lab Results   Component Value Date    TSH 1.280 12/29/2023    LDL 64 12/29/2024    HGBA1C 5.90 (H) 11/08/2024    [id number] 826 12/29/2023       IMAGING STUDIES:  CT Angiogram Head w AI Analysis of LVO    Result Date: 12/29/2024  (COMBINED) No emergent large vessel occlusion. No hemodynamically significant arterial stenoses are seen. Please see above comments for further detail.   Portions of this note were completed with a voice recognition program.  Electronically Signed By-Prashant Zuniga MD On:12/29/2024 12:12 AM      CT Angiogram Neck    Result Date: 12/29/2024  (COMBINED) No emergent large vessel occlusion. No hemodynamically significant arterial stenoses are seen. Please see above comments for further detail.   Portions of this note were completed  with a voice recognition program.  Electronically Signed ByEllen Zuniga MD On:12/29/2024 12:12 AM      XR Chest 1 View    Result Date: 12/28/2024  No acute infiltrate is appreciated.    Portions of this note were completed with a voice recognition program.  Electronically Signed ByEllen Zuniga MD On:12/28/2024 11:38 PM      CT CEREBRAL PERFUSION WITH & WITHOUT CONTRAST    Result Date: 12/28/2024  CTP shows no core infarct or ischemic penumbra using established thresholds.  Total CBF < 30%: 0 mL. Total Tmax > 6 sec: 0 mL. Total Mismatch difference: 0 mL. Total Mismatch ratio: None.   Portions of this note were completed with a voice recognition program.  Electronically Signed ByEllen Zuniga MD On:12/28/2024 11:32 PM      CT Head Without Contrast Stroke Protocol    Result Date: 12/28/2024  No acute brain abnormality is appreciated. No acute intracranial hemorrhage. No acute infarct. Please see above comments for further detail.    Portions of this note were completed with a voice recognition program.  Electronically Signed ByEllen Zuniga MD On:12/28/2024 10:49 PM       I reviewed the patient's new clinical results.    ________________________________________________________     PROBLEM LIST:    TIA (transient ischemic attack)    Pure hypercholesterolemia    Primary hypertension    Prediabetes    Alcohol use disorder in remission            ASSESSMENT/PLAN:  Feeling strange    I am not really impressed with anything focal    He had not had a drink for 3 days and was likely feeling the effects, no classic DTs though    Because of his risk factors I am okay with aspirin and continue Lipitor and modification of stroke risk factors.    If he gets an MRI and that is abnormal please let me know otherwise I would not change anything different    Obviously not tenecteplase or intervention candidate    I told him to drink less or in moderation or even quit considering his other comorbidities        Modification of  stroke risk factors:   - Blood pressure should be less than 130/80 outpatient, HbA1c less than 6.5, LDL less than 70; b12>500 and smoking cessation if applicable. We would be grateful if the primary team / primary care physician would keep a close watch on the above targets.  - Stroke education  - Follow up with neurologist of choice      I discussed the patient's findings and my recommendations with patient, family, and nursing staff    Erick Hunter MD  12/29/24  11:16 EST

## 2024-12-29 NOTE — ED PROVIDER NOTES
Time: 11:11 PM EST  Date of encounter:  12/28/2024  Independent Historian/Clinical History and Information was obtained by:   Patient    History is limited by: N/A    Chief Complaint: left sided numbness      History of Present Illness:  Patient is a 44 y.o. year old male who presents to the emergency department for evaluation of Left-sided numbness.  This started 9:40 PM.  He denies any weakness.  Patient states he was just sitting at onset of the symptoms.  Did report some palpitations.  No chest pain or shortness of breath.  He had mild nausea en route to the emergency department.      Patient Care Team  Primary Care Provider: Teresita Mcbride MD    Past Medical History:     No Known Allergies  Past Medical History:   Diagnosis Date    Hyperlipidemia     Hypertension      Past Surgical History:   Procedure Laterality Date    THROAT SURGERY       No family history on file.    Home Medications:  Prior to Admission medications    Medication Sig Start Date End Date Taking? Authorizing Provider   amLODIPine (NORVASC) 10 MG tablet Take 1 tablet by mouth Daily. 10/14/24  Yes Teresita Mcbride MD   atorvastatin (LIPITOR) 40 MG tablet Take 1 tablet by mouth Daily. 8/26/24  Yes Teresita Mcbride MD   lisinopril-hydrochlorothiazide (Zestoretic) 20-12.5 MG per tablet Take 2 tablets by mouth Daily. 8/26/24  Yes Teresita Mcbride MD   metFORMIN ER (GLUCOPHAGE-XR) 500 MG 24 hr tablet Take 1 tablet by mouth 2 (Two) Times a Day. 10/14/24  Yes Teresita Mcbride MD   glucose blood test strip Check BS BID or prn 1/8/24   Teresita Mcbride MD   glucose monitor monitoring kit Use 1 each As Needed (check BS BID or prn). 1/8/24   Teresita Mcbride MD   Lancets misc Check BS q d  or prn 1/8/24   Teresita Mcbride MD        Social History:   Social History     Tobacco Use    Smoking status: Never    Smokeless tobacco:  "Never   Vaping Use    Vaping status: Never Used   Substance Use Topics    Alcohol use: Yes    Drug use: Never         Review of Systems:  Review of Systems   Constitutional:  Negative for chills and fever.   HENT:  Negative for congestion, ear pain and sore throat.    Eyes:  Negative for pain.   Respiratory:  Negative for cough, chest tightness and shortness of breath.    Cardiovascular:  Negative for chest pain.   Gastrointestinal:  Negative for abdominal pain, diarrhea, nausea and vomiting.   Genitourinary:  Negative for flank pain and hematuria.   Musculoskeletal:  Negative for joint swelling.   Skin:  Negative for pallor.   Neurological:  Positive for numbness. Negative for seizures and headaches.   All other systems reviewed and are negative.       Physical Exam:  /87 (BP Location: Right arm, Patient Position: Lying)   Pulse 78   Temp 98.2 °F (36.8 °C) (Oral)   Resp 16   Ht 167.6 cm (66\")   Wt 83.3 kg (183 lb 10.3 oz)   SpO2 97%   BMI 29.64 kg/m²     Physical Exam  Constitutional:       Appearance: Normal appearance.   HENT:      Head: Normocephalic and atraumatic.      Nose: Nose normal.      Mouth/Throat:      Mouth: Mucous membranes are moist.   Eyes:      Extraocular Movements: Extraocular movements intact.      Conjunctiva/sclera: Conjunctivae normal.      Pupils: Pupils are equal, round, and reactive to light.   Cardiovascular:      Rate and Rhythm: Normal rate and regular rhythm.      Pulses: Normal pulses.      Heart sounds: Normal heart sounds.   Pulmonary:      Effort: Pulmonary effort is normal.      Breath sounds: Normal breath sounds.   Abdominal:      General: There is no distension.      Palpations: Abdomen is soft.      Tenderness: There is no abdominal tenderness.   Musculoskeletal:         General: Normal range of motion.      Cervical back: Normal range of motion.   Skin:     General: Skin is warm and dry.      Capillary Refill: Capillary refill takes less than 2 seconds. "   Neurological:      Mental Status: He is alert and oriented to person, place, and time.      Comments: Subjective decrease sensation left arm, left leg, left face.  No other focal deficits   Psychiatric:         Mood and Affect: Mood normal.         Behavior: Behavior normal.                    Medical Decision Making:      Comorbidities that affect care:    Hypertension    External Notes reviewed:    Previous Clinic Note: Patient seen by his PCP on 11/13/2024 for influenza vaccination, hypertension, hyperlipidemia, diabetes, vitamin D deficiency      The following orders were placed and all results were independently analyzed by me:  Orders Placed This Encounter   Procedures    CT Head Without Contrast Stroke Protocol    CT Angiogram Head w AI Analysis of LVO    CT Angiogram Neck    CT CEREBRAL PERFUSION WITH & WITHOUT CONTRAST    XR Chest 1 View    MRI Brain Without Contrast    [place] Draw    Comprehensive Metabolic Panel    Protime-INR    aPTT    Urinalysis With Microscopic If Indicated (No Culture) - Urine, Clean Catch    CBC Auto Differential    Diet: Cardiac; Healthy Heart (2-3 Na+); Fluid Consistency: Thin (IDDSI 0)    Initiate Department's Acute Stroke Process (Team D, Code 19, etc)    Perform NIH Stroke Scale    Measure Actual Weight    Head of Bed 30 Degrees or Less    Undress and Gown    Continuous Pulse Oximetry    Vital Signs    Neuro Checks    Notify Provider for SBP <80 or >200    Notify Provider for SBP >140 (For Hemorrhagic Stroke)    No Hypotonic Fluids    Nursing Dysphagia Screening (Complete Prior to Giving anything PO)    RN to Place Order SLP Consult (IF swallow screen failed) - Eval & Treat Choosing Reason of RN Dysphagia Screen Failed    Inpatient Hospitalist Consult    Oxygen Therapy- Nasal Cannula; Titrate 1-6 LPM Per SpO2; 90 - 95%    POC Glucose Once    ECG 12 Lead ED Triage Standing Order; Acute Stroke (Onset <12 hrs)    Adult Transthoracic Echo Complete W/ Cont if Necessary Per  Protocol    Type & Screen    Insert Large Bore Peripheral IV - Right AC Preferred    Initiate Observation Status    CBC & Differential    Green Top (Gel)    Lavender Top    Gold Top - SST    Light Blue Top       Medications Given in the Emergency Department:  Medications   sodium chloride 0.9 % flush 10 mL (has no administration in time range)   aspirin tablet 325 mg (has no administration in time range)   clopidogrel (PLAVIX) tablet 300 mg (has no administration in time range)   potassium chloride (MICRO-K/KLOR-CON) CR capsule (has no administration in time range)   iopamidol (ISOVUE-370) 76 % injection 150 mL (150 mL Intravenous Given 12/28/24 2315)        ED Course:    ED Course as of 12/29/24 0001   Sat Dec 28, 2024   2343 ECG 12 Lead ED Triage Standing Order; Acute Stroke (Onset <12 hrs)  Sinus rhythm with rate of 70.  No acute ST elevation.  Nonspecific T wave abnormalities.  Normal DC and QTc.  EKG interpreted by me [LD]      ED Course User Index  [LD] Dmitri Ruiz MD       Labs:    Lab Results (last 24 hours)       Procedure Component Value Units Date/Time    POC Glucose Once [[id number]]  (Abnormal) Collected: 12/28/24 2235    Specimen: Blood Updated: 12/28/24 2238     Glucose 145 mg/dL      Comment: Serial Number: [id number]:  [id number]       CBC & Differential [[id number]]  (Normal) Collected: 12/28/24 2239    Specimen: Blood Updated: 12/28/24 2259    Narrative:      The following orders were created for panel order CBC & Differential.  Procedure                               Abnormality         Status                     ---------                               -----------         ------                     CBC Auto Differential[[id number]]        Normal              Final result                 Please view results for these tests on the individual orders.    Comprehensive Metabolic Panel [[id number]]  (Abnormal) Collected: 12/28/24 2239    Specimen: Blood Updated: 12/28/24 2319     Glucose 154  mg/dL      BUN 19 mg/dL      Creatinine 1.15 mg/dL      Sodium 140 mmol/L      Potassium 3.3 mmol/L      Chloride 98 mmol/L      CO2 27.1 mmol/L      Calcium 9.9 mg/dL      Total Protein 8.1 g/dL      Albumin 5.1 g/dL      ALT (SGPT) 43 U/L      AST (SGOT) 43 U/L      Alkaline Phosphatase 56 U/L      Total Bilirubin 0.4 mg/dL      Globulin 3.0 gm/dL      A/G Ratio 1.7 g/dL      BUN/Creatinine Ratio 16.5     Anion Gap 14.9 mmol/L      eGFR 80.5 mL/min/1.73     Narrative:      GFR Categories in Chronic Kidney Disease (CKD)      GFR Category          GFR (mL/min/1.73)    Interpretation  G1                     90 or greater         Normal or high (1)  G2                      60-89                Mild decrease (1)  G3a                   45-59                Mild to moderate decrease  G3b                   30-44                Moderate to severe decrease  G4                    15-29                Severe decrease  G5                    14 or less           Kidney failure          (1)In the absence of evidence of kidney disease, neither GFR category G1 or G2 fulfill the criteria for CKD.    eGFR calculation 2021 CKD-EPI creatinine equation, which does not include race as a factor    Protime-INR [[id number]]  (Normal) Collected: 12/28/24 2239    Specimen: Blood Updated: 12/28/24 2331     Protime 12.3 Seconds      INR 0.89    Narrative:      Suggested Therapeutic Ranges For Oral Anticoagulant Therapy:  Level of Therapy                      INR Target Range  Standard Dose                            2.0-3.0  High Dose                                2.5-3.5  Patients not receiving anticoagulant  Therapy Normal Range                     0.86-1.15    aPTT [[id number]]  (Normal) Collected: 12/28/24 2239    Specimen: Blood Updated: 12/28/24 2331     PTT 25.1 seconds     CBC Auto Differential [[id number]]  (Normal) Collected: 12/28/24 2239    Specimen: Blood Updated: 12/28/24 2259     WBC 7.52 10*3/mm3      RBC 4.92 10*6/mm3       Hemoglobin 14.1 g/dL      Hematocrit 41.8 %      MCV 85.0 fL      MCH 28.7 pg      MCHC 33.7 g/dL      RDW 12.8 %      RDW-SD 39.8 fl      MPV 10.0 fL      Platelets 282 10*3/mm3      Neutrophil % 58.5 %      Lymphocyte % 30.6 %      Monocyte % 8.2 %      Eosinophil % 1.5 %      Basophil % 0.9 %      Immature Grans % 0.3 %      Neutrophils, Absolute 4.40 10*3/mm3      Lymphocytes, Absolute 2.30 10*3/mm3      Monocytes, Absolute 0.62 10*3/mm3      Eosinophils, Absolute 0.11 10*3/mm3      Basophils, Absolute 0.07 10*3/mm3      Immature Grans, Absolute 0.02 10*3/mm3      nRBC 0.0 /100 WBC              Imaging:    XR Chest 1 View    Result Date: 12/28/2024  XR CHEST 1 VW-  Date of exam: 12/28/2024, 11:32 P.M.  Indication: Acute stroke protocol (onset < 12 hrs).  Comparison: 7/31/2023.  FINDINGS: A single AP (or PA) upright portable chest radiograph was performed. No cardiac enlargement is seen. No acute infiltrate is appreciated. No pleural effusion or pneumothorax is identified. No significant interval change is seen since the prior study (or studies).       No acute infiltrate is appreciated.    Portions of this note were completed with a voice recognition program.  Electronically Signed By-Prashant Zuniga MD On:12/28/2024 11:38 PM      CT CEREBRAL PERFUSION WITH & WITHOUT CONTRAST    Result Date: 12/28/2024  CT CEREBRAL PERFUSION W WO CONTRAST-  Date of exam: 12/28/2024, 11:02 P.M.  Indications: Neuro deficit, acute; possible acute ischemic stroke (AIS); h/o HTN; left sided numbness & weakness.  Comparisons: 12/28/2024; 7/31/2023.  TECHNIQUE: Axial CT images of the brain were obtained prior to and after the administration of 40 mL of Isovue-370 nonionic contrast agent. Core blood volume, core blood flow, mean transit time, and Tmax images were obtained utilizing the Rapid software protocol. A limited CT angiogram of the head was also performed to measure the blood vessel density. The radiation dose reduction device  was turned on for each scan per the ALARA (As Low as Reasonably Achievable) protocol.  Additionally, CT perfusion (CTP) was performed, typically utilizing (again) a total of 40 mL of nonionic intravenous contrast agent (as indicated above) with an injection rate of 5 mL/s. A total of 8 cm of brain coverage (acquired in a single 8 cm slab) was used for the CTP study. The images were processed using Wo[employer] software (by [employer] [place], [place]). The CTP maps, including the cerebral blood volume (CBV), cerebral blood flow (CBF), mean transit time (MTT), time to maximum (Tmax), and time to peak (TTP), were reviewed as well as the summary map(s). The examination appears to be technically adequate. There arterial input function (AIF) and the venous output function (VOF) appear to be appropriately placed.  FINDINGS:  TOTAL HYPOPERFUSION: Using a threshold of Tmax greater than 6 seconds, there is no convincing evidence for an area of acute cerebral hypoperfusion within the imaged brain.  CORE INFARCT: Using the threshold of CBF less than 30 percent, there is no convincing CTP evidence for an acute ischemic core (infarct).  PENUMBRA: No penumbra (or tissue at risk, TAR) is appreciated.       CTP shows no core infarct or ischemic penumbra using established thresholds.  Total CBF < 30%: 0 mL. Total Tmax > 6 sec: 0 mL. Total Mismatch difference: 0 mL. Total Mismatch ratio: None.   Portions of this note were completed with a voice recognition program.  Electronically Signed By-Prashant Zuniga MD On:12/28/2024 11:32 PM      CT Head Without Contrast Stroke Protocol    Result Date: 12/28/2024  CT HEAD WO CONTRAST STROKE PROTOCOL-  Date of exam: 12/28/2024, 10:35 P.M.  Indications: Left-sided numbness & weakness; blurred vision; h/o HTN.  Comparisons: 7/31/2023.  TECHNIQUE: Axial CT images were obtained of the head without contrast administration. Reconstructed 2D coronal and sagittal images were also obtained. Automated  exposure control and iterative construction methods were used. Additionally, the radiation dose reduction device was turned on for each scan per the ALARA (As Low as Reasonably Achievable) protocol. Please see the electronic medical record, EMR (i.e., Epic), for the documented radiation dose.  FINDINGS: A routine nonenhanced head CT was performed. No acute brain abnormality is seen. No acute infarct. No acute intracranial hemorrhage. No midline shift or acute intracranial mass effect is seen. The ventricular system and the extra-axial spaces are within normal limits for the patient's age. There is suspected mild chronic small vessel ischemic disease. No acute skull fracture is identified. There is mild chronic leftward nasoseptal deviation. No significant acute findings are seen with regard to the imaged orbits, paranasal sinuses, or middle ear clefts.       No acute brain abnormality is appreciated. No acute intracranial hemorrhage. No acute infarct. Please see above comments for further detail.    Portions of this note were completed with a voice recognition program.  Electronically Signed By-Prashant Zuniga MD On:12/28/2024 10:49 PM         Differential Diagnosis and Discussion:    Stroke: Differential diagnosis in this patient with signs of possible ischemic stroke include TIA or ischemic stroke, hemorrhagic stroke, hypoglycemic episode, toxic or metabolic encephalopathy, paresthesias.    PROCEDURES:    Labs were collected in the emergency department and all labs were reviewed and interpreted by me.  X-ray were performed in the emergency department and all X-ray impressions were independently interpreted by me.  An EKG was performed and the EKG was interpreted by me.  CT scan was performed in the emergency department and the CT scan radiology impression was interpreted by me.    ECG 12 Lead ED Triage Standing Order; Acute Stroke (Onset <12 hrs)   Preliminary Result   HEART RATE=70  bpm   RR Mgsuitix=558  ms   HI  Aurmfjea=521  ms   P Horizontal Axis=-6  deg   P Front Axis=31  deg   QRSD Interval=97  ms   QT Pfuxumce=489  ms   ZYbX=441  ms   QRS Axis=4  deg   T Wave Axis=0  deg   - BORDERLINE ECG -   Sinus rhythm   Ventricular premature complex   Borderline T abnormalities, inferior leads   Minimal ST elevation, anterior leads   Date and Time of Study:2024-12-28 23:33:20          Procedures    MDM  Number of Diagnoses or Management Options  Diagnosis management comments: Patient presented to the emergency department a left-sided numbness.  No other findings on exam.  Imaging did not show acute findings.  Patient was discussed with teleneurology recommended admission for further workup.  Discussed patient with hospitalist and will admit for further care.       Amount and/or Complexity of Data Reviewed  Clinical lab tests: reviewed  Tests in the radiology section of CPT®: reviewed  Review and summarize past medical records: yes  Independent visualization of images, tracings, or specimens: yes    Risk of Complications, Morbidity, and/or Mortality  Presenting problems: moderate  Management options: moderate                       Patient Care Considerations:    SEPSIS was considered but is NOT present in the emergency department as SIRS criteria is not present.      Consultants/Shared Management Plan:    Hospitalist: I have discussed the case with Dr Saleh who agrees to accept the patient for admission.    Social Determinants of Health:    Patient is independent, reliable, and has access to care.       Disposition and Care Coordination:    Admit:   Through independent evaluation of the patient's history, physical, and imperical data, the patient meets criteria for inpatient admission to the hospital.        Final diagnoses:   TIA (transient ischemic attack)        ED Disposition       ED Disposition   Decision to Admit    Condition   --    Comment   Level of Care: Telemetry [5]   Diagnosis: TIA (transient ischemic attack)  [269940]   Admitting Physician: ULISES GARCIA [[id number]]                 This medical record created using voice recognition software.             Dmitri Ruiz MD  12/29/24 0001

## 2024-12-30 ENCOUNTER — READMISSION MANAGEMENT (OUTPATIENT)
Dept: CALL CENTER | Facility: HOSPITAL | Age: 44
End: 2024-12-30
Payer: MEDICAID

## 2024-12-30 VITALS
BODY MASS INDEX: 29.51 KG/M2 | SYSTOLIC BLOOD PRESSURE: 142 MMHG | DIASTOLIC BLOOD PRESSURE: 94 MMHG | HEIGHT: 66 IN | TEMPERATURE: 98.1 F | HEART RATE: 67 BPM | RESPIRATION RATE: 20 BRPM | OXYGEN SATURATION: 100 % | WEIGHT: 183.64 LBS

## 2024-12-30 PROCEDURE — 97166 OT EVAL MOD COMPLEX 45 MIN: CPT

## 2024-12-30 PROCEDURE — 25010000002 ENOXAPARIN PER 10 MG: Performed by: FAMILY MEDICINE

## 2024-12-30 PROCEDURE — 92610 EVALUATE SWALLOWING FUNCTION: CPT

## 2024-12-30 PROCEDURE — 97161 PT EVAL LOW COMPLEX 20 MIN: CPT

## 2024-12-30 PROCEDURE — G0378 HOSPITAL OBSERVATION PER HR: HCPCS

## 2024-12-30 PROCEDURE — 96372 THER/PROPH/DIAG INJ SC/IM: CPT

## 2024-12-30 RX ORDER — HYDROCHLOROTHIAZIDE 25 MG/1
25 TABLET ORAL
Status: DISCONTINUED | OUTPATIENT
Start: 2024-12-30 | End: 2024-12-30 | Stop reason: HOSPADM

## 2024-12-30 RX ORDER — LISINOPRIL 20 MG/1
20 TABLET ORAL
Status: DISCONTINUED | OUTPATIENT
Start: 2024-12-30 | End: 2024-12-30 | Stop reason: HOSPADM

## 2024-12-30 RX ORDER — ASPIRIN 81 MG/1
81 TABLET, CHEWABLE ORAL DAILY
Qty: 30 TABLET | Refills: 0 | Status: SHIPPED | OUTPATIENT
Start: 2024-12-31 | End: 2025-01-30

## 2024-12-30 RX ADMIN — ENOXAPARIN SODIUM 40 MG: 100 INJECTION SUBCUTANEOUS at 08:48

## 2024-12-30 RX ADMIN — LISINOPRIL 20 MG: 20 TABLET ORAL at 08:48

## 2024-12-30 RX ADMIN — ASPIRIN 81 MG: 81 TABLET, CHEWABLE ORAL at 08:48

## 2024-12-30 RX ADMIN — Medication 10 ML: at 08:48

## 2024-12-30 RX ADMIN — HYDROCHLOROTHIAZIDE 25 MG: 25 TABLET ORAL at 08:48

## 2024-12-30 NOTE — THERAPY EVALUATION
Acute Care - Physical Therapy Initial Evaluation  [facility]     Patient Name: Nessa Brown  : 1980  MRN: [id number]  Today's Date: 2024     Admit date: 2024     Referring Physician: Davidson Perkins DO     Surgery Date:* No surgery found *             Visit Dx:     ICD-10-CM ICD-9-CM   1. TIA (transient ischemic attack)  G45.9 435.9   2. Dysphagia, oropharyngeal  R13.12 787.22   3. Difficulty in walking  R26.2 719.7     Patient Active Problem List   Diagnosis    Vitamin D deficiency    Pure hypercholesterolemia    Primary hypertension    Prediabetes    Alcohol use disorder in remission    Tobacco abuse, in remission    Type 2 diabetes mellitus with hyperglycemia, without long-term current use of insulin    Routine history and physical examination of adult    TIA (transient ischemic attack)     Past Medical History:   Diagnosis Date    Hyperlipidemia     Hypertension      Past Surgical History:   Procedure Laterality Date    THROAT SURGERY       PT Assessment (Last 12 Hours)       PT Evaluation and Treatment       Row Name 24 1000          Physical Therapy Time and Intention    Subjective Information no complaints  -ANDREINA     Document Type evaluation  -ANDREINA     Mode of Treatment individual therapy;physical therapy  -ANDREINA     Patient Effort good  -ANDREINA       Row Name 24 1000          General Information    Patient Observations alert;cooperative;agree to therapy  -ANDREINA     Prior Level of Function independent:;all household mobility;community mobility  -ANDREINA     Equipment Currently Used at Home none  -ANDREINA     Existing Precautions/Restrictions no known precautions/restrictions  -ANDREINA     Barriers to Rehab none identified  -ANDREINA       Row Name 24 1000          Living Environment    Current Living Arrangements home  -ANDREINA       Row Name 24 1000          Cognition    Orientation Status (Cognition) oriented x 3  -ANDREINA       Row Name 24 1000          Range of Motion (ROM)    Range of  Motion ROM is WFL  -ANDREINA       Row Name 12/30/24 1000          Strength (Manual Muscle Testing)    Strength (Manual Muscle Testing) strength is WFL  -ANDREINA       Row Name 12/30/24 1000          Bed Mobility    Bed Mobility bed mobility (all) activities;supine-sit  -ANDREINA     All Activities, [place] (Bed Mobility) independent  -ANDREINA     Supine-Sit [place] (Bed Mobility) independent  -ANDREINA       Row Name 12/30/24 1000          Transfers    Transfers sit-stand transfer  -ANDREINA       Row Name 12/30/24 1000          Sit-Stand Transfer    Sit-Stand [place] (Transfers) independent  -ANDREINA       Row Name 12/30/24 1000          Gait/Stairs (Locomotion)    Gait/Stairs Locomotion gait/ambulation independence  -ANDREINA     [place] Level (Gait) independent  -ANDREINA     Distance in Feet (Gait) 50  -ANDREINA       Row Name 12/30/24 1000          Balance    Balance Assessment standing dynamic balance  -ANDREINA     Dynamic Standing Balance independent  -ANDREINA       Row Name 12/30/24 1000          Plan of Care Review    Plan of Care Reviewed With patient  -ANDREINA     Outcome Evaluation Pt did not demonstrate any safety or mobility deficits during the initial evaluation.  Pt is safe to continue ambulating within their room independently until their discharge from the hospital.  Pt will be discharged from PT services at this time.  -ANDREINA       Row Name 12/30/24 1000          Therapy Assessment/Plan (PT)    Criteria for Skilled Interventions Met (PT) no problems identified which require skilled intervention  -ANDREINA     Therapy Frequency (PT) evaluation only  -ANDREINA       Row Name 12/30/24 1000          PT Evaluation Complexity    History, PT Evaluation Complexity no personal factors and/or comorbidities  -ANDREINA     Examination of Body Systems (PT Eval Complexity) total of 4 or more elements  -ANDREINA     Clinical Presentation (PT Evaluation Complexity) stable  -ANDREINA     Clinical Decision Making (PT Evaluation Complexity) low complexity  -ANDREINA     Overall Complexity (PT Evaluation  Complexity) low complexity  -ANDREINA       Row Name 12/30/24 1000          Therapy Plan Review/Discharge Plan (PT)    Therapy Plan Review (PT) evaluation/treatment results reviewed;participants voiced agreement with care plan;participants included;patient  -ANDREINA       Row Name 12/30/24 1000          Physical Therapy Goals    Problem Specific Goal Selection (PT) problem specific goal 1, PT  -ANDREINA       Row Name 12/30/24 1000          Problem Specific Goal 1 (PT)    Problem Specific Goal 1 (PT) Complete PT evaluation  -ANDREINA     Time Frame (Problem Specific Goal 1, PT) 1 day  -ANDREINA     Progress/Outcome (Problem Specific Goal 1, PT) goal met  -ANDREINA               User Key  (r) = Recorded By, (t) = Taken By, (c) = Cosigned By      Initials Name Provider Type    Taiwo Steele, PT Physical Therapist                    Physical Therapy Education        No education to display                  PT Recommendation and Plan  Anticipated Discharge Disposition (PT): home  Therapy Frequency (PT): evaluation only  Plan of Care Reviewed With: patient  Outcome Evaluation: Pt did not demonstrate any safety or mobility deficits during the initial evaluation.  Pt is safe to continue ambulating within their room independently until their discharge from the hospital.  Pt will be discharged from PT services at this time.   Outcome Measures       Row Name 12/30/24 1000             How much help from another person do you currently need...    Turning from your back to your side while in flat bed without using bedrails? 4  -ANDREINA      Moving from lying on back to sitting on the side of a flat bed without bedrails? 4  -ANDREINA      Moving to and from a bed to a chair (including a wheelchair)? 4  -ANDREINA      Standing up from a chair using your arms (e.g., wheelchair, bedside chair)? 4  -ADNREINA      Climbing 3-5 steps with a railing? 4  -ANDREINA      To walk in hospital room? 4  -ANDREINA      AM-PAC 6 Clicks Score (PT) 24  -ANDREINA         Functional Assessment    Outcome Measure  Options AM-PAC 6 Clicks Basic Mobility (PT)  -ANDREINA                User Key  (r) = Recorded By, (t) = Taken By, (c) = Cosigned By      Initials Name Provider Type    Taiwo Steele, ZECHARIAH Physical Therapist                     Time Calculation:    PT Charges       Row Name 12/30/24 1048             Time Calculation    PT Received On 12/30/24  -ANDREINA         Untimed Charges    PT Eval/Re-eval Minutes 20  -ANDREINA         Total Minutes    Untimed Charges Total Minutes 20  -ANDREINA       Total Minutes 20  -ANDREINA                User Key  (r) = Recorded By, (t) = Taken By, (c) = Cosigned By      Initials Name Provider Type    Taiwo Steele, PT Physical Therapist                      PT G-Codes  Outcome Measure Options: AM-PAC 6 Clicks Basic Mobility (PT)  AM-PAC 6 Clicks Score (PT): 24    Taiwo Jasmine, PT  12/30/2024

## 2024-12-30 NOTE — PLAN OF CARE
Goal Outcome Evaluation:           Progress: no change (Evaluation)  Outcome Evaluation: Patient does not demonstrate a decline in ADL/IADL function/ mobility and will not benefit from skilled services at this time.Patient is safe to discharge to previous setting when medically appropriate. Plan to discharge OT. Please feel free to re-consult if new needs arise.    Anticipated Discharge Disposition (OT): home

## 2024-12-30 NOTE — PLAN OF CARE
Goal Outcome Evaluation:       FUNCTIONAL ASSESSMENT INSTRUMENT: Patient currently scored a level 7 of 7 on Functional Communication Measures for swallowing indicating a 0% limitation in function.     ASSESSMENT/ PLAN OF CARE:  Pt presents with functional oropharyngeal swallow.  No clinical signs or symptoms of aspiration noted.  Vocal quality remained clear     REHAB POTENTIAL:  Pt has good rehab potential.  The following limitations may influence improvement/ length of tx medical status.     RECOMMENDATIONS:   1.   DIET: Regular diet-thin liquids     2.  POSITION: 90 degrees upright     3.  COMPENSATORY STRATEGIES: General Swallow precautions     Pt/responsible party agrees with plan of care and has been informed of all alternatives, risks and benefits.     EDUCATION  The patient has been educated in the following areas:   Dysphagia (Swallowing Impairment).            Mindy Chadwick, SLP               12/30/2024

## 2024-12-30 NOTE — THERAPY EVALUATION
Acute Care - Speech Language Pathology   Swallow Initial Evaluation [facility]     Patient Name: Nessa Brown  : 1980  MRN: [id number]  Today's Date: 2024               Admit Date: 2024    Visit Dx:     ICD-10-CM ICD-9-CM   1. TIA (transient ischemic attack)  G45.9 435.9   2. Dysphagia, oropharyngeal  R13.12 787.22     Patient Active Problem List   Diagnosis    Vitamin D deficiency    Pure hypercholesterolemia    Primary hypertension    Prediabetes    Alcohol use disorder in remission    Tobacco abuse, in remission    Type 2 diabetes mellitus with hyperglycemia, without long-term current use of insulin    Routine history and physical examination of adult    TIA (transient ischemic attack)     Past Medical History:   Diagnosis Date    Hyperlipidemia     Hypertension      Past Surgical History:   Procedure Laterality Date    THROAT SURGERY       Inpatient Speech Pathology Dysphagia Evaluation     PAIN SCALE: None noted     PRECAUTIONS/CONTRAINDICATIONS: None noted     SUSPECTED ABUSE/NEGLECT/EXPLOITATION: None noted     SOCIAL/PSYCHOLOGICAL NEEDS/BARRIERS: None noted     PAST SOCIAL HISTORY: Lives at home     PRIOR FUNCTION: Independent     PATIENT GOALS/EXPECTATIONS: Did not report     HISTORY: Patient is an 44-year-old admitted to rule out CVA. MRI negative.      CURRENT DIET LEVEL: Regular diet      OBJECTIVE:    TEST ADMINISTERED: Clinical dysphagia evaluation     COGNITION/SAFETY AWARENESS: Alert     BEHAVIORAL OBSERVATIONS: Cooperative     ORAL MOTOR EXAM: Lingual and labial strength and range of motion within functional limits     VOICE QUALITY: Within normal limits     REFLEX EXAM: Deferred     POSTURE: 90 degrees upright     FEEDING/SWALLOWING FUNCTION: Assessed with full range of consistencies with thin liquids from spoon cup and straw, purée consistency soft and hard solids     CLINICAL OBSERVATIONS: Oral stage is characterized by good bolus preparation and control.  Timely  transit.  Pharyngeal phase appears timely with good laryngeal elevation per palpation.  No clinical signs or symptoms of aspiration are noted.  Vocal quality remained clear     DYSPHAGIA CRITERIA: N/A     FUNCTIONAL ASSESSMENT INSTRUMENT: Patient currently scored a level 7 of 7 on Functional Communication Measures for swallowing indicating a 0% limitation in function.     ASSESSMENT/ PLAN OF CARE:  Pt presents with functional oropharyngeal swallow.  No clinical signs or symptoms of aspiration noted.  Vocal quality remained clear     REHAB POTENTIAL:  Pt has good rehab potential.  The following limitations may influence improvement/ length of tx medical status.     RECOMMENDATIONS:   1.   DIET: Regular diet-thin liquids     2.  POSITION: 90 degrees upright     3.  COMPENSATORY STRATEGIES: General Swallow precautions     Pt/responsible party agrees with plan of care and has been informed of all alternatives, risks and benefits.     EDUCATION  The patient has been educated in the following areas:   Dysphagia (Swallowing Impairment).            Mindy Chadwick, SLP               12/30/2024

## 2024-12-30 NOTE — CONSULTS
Consult received per Stroke Protocol. Patient with a noted DM diagnosis, with a current HbA1c of 6.1%, and an estimated average glucose of 128 mg/dl. Patient's home regimen consists of 500 mg Metformin ER BID.

## 2024-12-30 NOTE — OUTREACH NOTE
Prep Survey      Flowsheet Row Responses   [facility]? [facility]   Is LACE score < 7 ? Yes   Eligibility [facility]   Date of Admission 12/28/24   Date of Discharge 12/30/24   Discharge Disposition Home or Self Care   Discharge diagnosis TIA   Does the patient have one of the following disease processes/diagnoses(primary or secondary)? Stroke   Does the patient have Home health ordered? No   Is there a DME ordered? No   Prep survey completed? Yes            CATRINA RENEE - Registered Nurse

## 2024-12-30 NOTE — DISCHARGE SUMMARY
[facility]         HOSPITALIST  DISCHARGE SUMMARY    Patient Name: Nessa Brown  : 1980  MRN: [id number]    Date of Admission: 2024  Date of Discharge:  24  Primary Care Physician: Teresita Mcbride MD    Consults       Date and Time Order Name Status Description    2024  4:53 AM Inpatient Neurology Consult Stroke Completed     2024 11:40 PM Inpatient Hospitalist Consult              Active and Resolved Hospital Problems:  Transient left-sided weakness/paresthesia  Transient ischemic attack  Hypertension  Hyperlipidemia  Prediabetic  Alcohol use    Hospital Course     Hospital Course:  Nessa Brown is a 44 y.o. male, Ukrainian-speaking with hypertension, hyperlipidemia, non-insulin type 2 diabetes who scented with chief complaint of left-sided weakness and left facial numbness.  No focal weakness or ataxia.  CT head, CTA head and neck, CT perfusion scan, MRI brain unremarkable. Vitals remained stable. Hemoglobin A1c 6.10, continued on metformin. LDL 64, continued on high intensity statin.  Neurology recommended baby aspirin as could not exclude TIA given presentation and risk factor. He noted heavy alcohol use leading up to onset of symptoms.  Ethanol level negative.  No signs of withdrawal.  Tolerated p.o. intake and was ambulatory.  Medication changes discussed, recommended close follow-up with PCP.  Discharged home in stable condition    Day of Discharge     Vital Signs:  Temp:  [97.3 °F (36.3 °C)-98.1 °F (36.7 °C)] 97.3 °F (36.3 °C)  Heart Rate:  [65-77] 72  Resp:  [16-19] 18  BP: (118-142)/(86-98) 142/96  Physical Exam:   GENERAL: conversant and nontoxic.  HEART: RRR. No edema  LUNGS: nonlabored  ABDOMEN: Soft, nondistended  NEUROLOGIC: Alert, CN intact, speech clear    Discharge Details        Discharge Medications        New Medications        Instructions Start Date   aspirin 81 MG chewable tablet   81 mg, Oral, Daily    Start Date: December 31, 2024            Continue These Medications        Instructions Start Date   amLODIPine 10 MG tablet  Commonly known as: NORVASC   10 mg, Oral, Daily      atorvastatin 40 MG tablet  Commonly known as: LIPITOR   40 mg, Oral, Daily      lisinopril-hydrochlorothiazide 20-12.5 MG per tablet  Commonly known as: Zestoretic   2 tablets, Oral, Daily      metFORMIN  MG 24 hr tablet  Commonly known as: GLUCOPHAGE-XR   500 mg, Oral, 2 Times Daily               No Known Allergies    Discharge Disposition:  Home or Self Care    Diet:  Hospital:  Diet Order   Procedures    Diet: Cardiac; Healthy Heart (2-3 Na+); Fluid Consistency: Thin (IDDSI 0)       Discharge Activity:   Activity Instructions       Activity as Tolerated              CODE STATUS:  Code Status and Medical Interventions: CPR (Attempt to Resuscitate); Full Support   Ordered at: 12/29/24 1716     Code Status (Patient has no pulse and is not breathing):    CPR (Attempt to Resuscitate)     Medical Interventions (Patient has pulse or is breathing):    Full Support         Future Appointments   Date Time Provider Department Center   4/15/2025  9:00 AM Teresita Mcbride MD [facility]       Additional Instructions for the Follow-ups that You Need to Schedule       Discharge Follow-up with PCP   As directed       Currently Documented PCP:    Teresita Mcbride MD    PCP Phone Number:    [phone]     Follow Up Details: 1 week                Pertinent  and/or Most Recent Results     PROCEDURES:   NONE    LAB RESULTS:      Lab 12/28/24  2239   WBC 7.52   HEMOGLOBIN 14.1   HEMATOCRIT 41.8   PLATELETS 282   NEUTROS ABS 4.40   IMMATURE GRANS (ABS) 0.02   LYMPHS ABS 2.30   MONOS ABS 0.62   EOS ABS 0.11   MCV 85.0   PROTIME 12.3   APTT 25.1         Lab 12/28/24  2239   SODIUM 140   POTASSIUM 3.3*   CHLORIDE 98   CO2 27.1   ANION GAP 14.9   BUN 19   CREATININE 1.15   EGFR 80.5   GLUCOSE 154*   CALCIUM 9.9    HEMOGLOBIN A1C 6.10*         Lab 12/28/24 2239   TOTAL PROTEIN 8.1   ALBUMIN 5.1   GLOBULIN 3.0   ALT (SGPT) 43*   AST (SGOT) 43*   BILIRUBIN 0.4   ALK PHOS 56         Lab 12/29/24 0224 12/28/24 2239   PROBNP  --  <36.0   HSTROP T 8 10   PROTIME  --  12.3   INR  --  0.89         Lab 12/29/24 0224   CHOLESTEROL 195   LDL CHOL 64   HDL CHOL 109*   TRIGLYCERIDES 132         Lab 12/28/24 2239   ABO TYPING A   RH TYPING Positive   ANTIBODY SCREEN Negative         Brief Urine Lab Results  (Last result in the past 365 days)        Color   Clarity   Blood   Leuk Est   Nitrite   Protein   CREAT   Urine HCG        08/26/24 0844             69.6               Microbiology Results (last 10 days)       ** No results found for the last 240 hours. **            MRI Brain Without Contrast    Result Date: 12/29/2024  Impression: MR examination of the brain without IV contrast demonstrating no acute intracranial abnormality. Electronically Signed: Farhat Hernandez MD  12/29/2024 5:27 PM EST  Workstation ID: [id number]    CT Angiogram Head w AI Analysis of LVO    Result Date: 12/29/2024  (COMBINED) No emergent large vessel occlusion. No hemodynamically significant arterial stenoses are seen. Please see above comments for further detail.   Portions of this note were completed with a voice recognition program.  Electronically Signed By-Prashant Zuniga MD On:12/29/2024 12:12 AM      CT Angiogram Neck    Result Date: 12/29/2024  (COMBINED) No emergent large vessel occlusion. No hemodynamically significant arterial stenoses are seen. Please see above comments for further detail.   Portions of this note were completed with a voice recognition program.  Electronically Signed ByEllen Zuniga MD On:12/29/2024 12:12 AM      XR Chest 1 View    Result Date: 12/28/2024  No acute infiltrate is appreciated.    Portions of this note were completed with a voice recognition program.  Electronically Signed ByEllen Zuniga MD On:12/28/2024 11:38 PM       CT CEREBRAL PERFUSION WITH & WITHOUT CONTRAST    Result Date: 12/28/2024  CTP shows no core infarct or ischemic penumbra using established thresholds.  Total CBF < 30%: 0 mL. Total Tmax > 6 sec: 0 mL. Total Mismatch difference: 0 mL. Total Mismatch ratio: None.   Portions of this note were completed with a voice recognition program.  Electronically Signed ByEllen Zuniga MD On:12/28/2024 11:32 PM      CT Head Without Contrast Stroke Protocol    Result Date: 12/28/2024  No acute brain abnormality is appreciated. No acute intracranial hemorrhage. No acute infarct. Please see above comments for further detail.    Portions of this note were completed with a voice recognition program.  Electronically Signed ByEllen Zuniga MD On:12/28/2024 10:49 PM                Results for orders placed during the hospital encounter of 07/31/23    Adult Transthoracic Echo Complete W/ Cont if Necessary Per Protocol    Interpretation Summary    Left ventricular systolic function is normal. Calculated left ventricular EF = 63.2%    Left ventricular wall thickness is consistent with mild concentric hypertrophy.    Left ventricular diastolic function was normal.    Estimated right ventricular systolic pressure from tricuspid regurgitation is mildly elevated (35-45 mmHg).      Labs Pending at Discharge:        Time spent on Discharge including face to face service:  >30 minutes    Electronically signed by Davidson Perkins DO, 12/30/24, 11:13 AM EST.

## 2024-12-30 NOTE — THERAPY EVALUATION
Patient Name: Nessa Brown  : 1980    MRN: [id number]                              Today's Date: 2024       Admit Date: 2024    Visit Dx:     ICD-10-CM ICD-9-CM   1. TIA (transient ischemic attack)  G45.9 435.9   2. Dysphagia, oropharyngeal  R13.12 787.22   3. Difficulty in walking  R26.2 719.7     Patient Active Problem List   Diagnosis    Vitamin D deficiency    Pure hypercholesterolemia    Primary hypertension    Prediabetes    Alcohol use disorder in remission    Tobacco abuse, in remission    Type 2 diabetes mellitus with hyperglycemia, without long-term current use of insulin    Routine history and physical examination of adult    TIA (transient ischemic attack)     Past Medical History:   Diagnosis Date    Hyperlipidemia     Hypertension      Past Surgical History:   Procedure Laterality Date    THROAT SURGERY        General Information       Row Name 24 1240          OT Time and Intention    Document Type evaluation  -SC     Mode of Treatment individual therapy;occupational therapy  -SC     Patient Effort good  -SC     Comment Patient primary language is Tajik.  Patient brother at bedside and helpful for interpretation.  -SC       Row Name 24 1240          General Information    Patient Profile Reviewed yes  -SC     Prior Level of Function independent:  PLOF is independence in the community. Patient has a walk in shower, stands to shower, stands to groom, and did not require home oxygen or DME.  -SC     Existing Precautions/Restrictions no known precautions/restrictions  -SC     Barriers to Rehab none identified  -SC       Row Name 24 1240          Occupational Profile    Reason for Services/Referral (Occupational Profile) [facility] 44 [facility] left sided numbness which has now reportedly resolved.  OT consulted due to a potential decline in function and mobility.  No previous OT services for current condition.   "-SC     Patient Goals (Occupational Profile) \"I want to go home.\"  -SC       Row Name 12/30/24 1240          Living Environment    People in Home sibling(s)  -SC       Row Name 12/30/24 1240          Home Main Entrance    Number of Stairs, Main Entrance eight  -SC     Stair Railings, Main Entrance railings safe and in good condition  -SC       Row Name 12/30/24 1240          Stairs Within Home, Primary    Number of Stairs, Within Home, Primary none  -SC     Stair Railings, Within Home, Primary none  -SC       Row Name 12/30/24 1240          Cognition    Orientation Status (Cognition) oriented x 3  -SC               User Key  (r) = Recorded By, (t) = Taken By, (c) = Cosigned By      Initials Name Provider Type    Fátima Chew OT Occupational Therapist                     Mobility/ADL's       Row Name 12/30/24 1245          Bed Mobility    Bed Mobility bed mobility (all) activities;supine-sit  -SC     All Activities, [place] (Bed Mobility) independent  -SC     Supine-Sit [place] (Bed Mobility) independent  -SC       Row Name 12/30/24 1245          Transfers    Transfers sit-stand transfer  -SC       Row Name 12/30/24 1245          Sit-Stand Transfer    Sit-Stand [place] (Transfers) independent  -SC       Row Name 12/30/24 1245          Functional Mobility    Functional Mobility- Comment Engaged patient in functional ambulation from bed to bathroom at an independent level. No balance issues noted.  -SC       Row Name 12/30/24 1245          Activities of Daily Living    BADL Assessment/Intervention bathing;upper body dressing;lower body dressing;grooming;feeding;toileting  Patient is currently independent for all basic ADLs.  -SC               User Key  (r) = Recorded By, (t) = Taken By, (c) = Cosigned By      Initials Name Provider Type    Fátima Chew OT Occupational Therapist                   Obj/Interventions       Row Name 12/30/24 1246          Sensory Assessment (Somatosensory)    " "Sensory Subjective Reports numbness  -SC     Sensory Assessment Difficult to fully assess due to language barrier. Brother interpreted that patient states, \"my left hand feels like it is sleeping.\"  -SC       Row Name 12/30/24 1246          Vision Assessment/Intervention    Visual Impairment/Limitations WFL  -[facility] 12/30/24 1246          Range of Motion Comprehensive    General Range of Motion no range of motion deficits identified  -SC       Row Name 12/30/24 1246          Strength Comprehensive (MMT)    General Manual Muscle Testing (MMT) Assessment no strength deficits identified  -SC       Row Name 12/30/24 1246          Motor Skills    Motor Skills coordination;functional endurance  -SC     Coordination WFL  -SC     Functional Endurance good  -SC       Row Name 12/30/24 1246          Balance    Comment, Balance independent  -SC               User Key  (r) = Recorded By, (t) = Taken By, (c) = Cosigned By      Initials Name Provider Type    Fátima Chew OT Occupational Therapist                   Goals/Plan    No documentation.                  Clinical Impression       Row Name 12/30/24 1247          Pain Assessment    Pretreatment Pain Rating 0/10 - no pain  -SC     Posttreatment Pain Rating 0/10 - no pain  -SC       Row Name 12/30/24 1247          Plan of Care Review    Progress no change  Evaluation  -SC     Outcome Evaluation Patient does not demonstrate a decline in ADL/IADL function/ mobility and will not benefit from skilled services at this time.Patient is safe to discharge to previous setting when medically appropriate. Plan to discharge OT. Please feel free to re-consult if new needs arise.  -SC       Row Name 12/30/24 1247          Therapy Assessment/Plan (OT)    Therapy Frequency (OT) evaluation only  -SC       Row Name 12/30/24 1247          Therapy Plan Review/Discharge Plan (OT)    Anticipated Discharge Disposition (OT) home  -SC       Row Name 12/30/24 1247          Positioning " and Restraints    Pre-Treatment Position in bed  -SC     Post Treatment Position bed  -SC     In Bed call light within reach;encouraged to call for assist  -SC               User Key  (r) = Recorded By, (t) = Taken By, (c) = Cosigned By      Initials Name Provider Type    SC Fátima Ballesteros OT Occupational Therapist                   Outcome Measures       Row Name 12/30/24 1251          How much help from another is currently needed...    Putting on and taking off regular lower body clothing? 4  -SC     Bathing (including washing, rinsing, and drying) 4  -SC     Toileting (which includes using toilet bed pan or urinal) 4  -SC     Putting on and taking off regular upper body clothing 4  -SC     Taking care of personal grooming (such as brushing teeth) 4  -SC     Eating meals 4  -SC     AM-PAC 6 Clicks Score (OT) 24  -SC       Row Name 12/30/24 1000 12/30/24 0724       How much help from another person do you currently need...    Turning from your back to your side while in flat bed without using bedrails? 4  -ANDREINA 4  -TS    Moving from lying on back to sitting on the side of a flat bed without bedrails? 4  -ANDREINA 4  -TS    Moving to and from a bed to a chair (including a wheelchair)? 4  -ANDREINA 4  -TS    Standing up from a chair using your arms (e.g., wheelchair, bedside chair)? 4  -ANDREINA 4  -TS    Climbing 3-5 steps with a railing? 4  -ANDREINA 4  -TS    To walk in hospital room? 4  -ANDREINA 4  -TS    AM-PAC 6 Clicks Score (PT) 24  -ANDREINA 24  -TS    Highest Level of Mobility Goal 8 --> Walked 250 feet or more  -ANDREINA 8 --> Walked 250 feet or more  -TS      Row Name 12/30/24 1251 12/30/24 1000       Functional Assessment    Outcome Measure Options AM-PAC 6 Clicks Daily Activity (OT);Optimal Instrument  -SC AM-PAC 6 Clicks Basic Mobility (PT)  -ANDREINA      Row Name 12/30/24 1251          Optimal Instrument    Optimal Instrument Optimal - 3  -SC     Bending/Stooping 1  -SC     Standing 1  -SC     Reaching 1  -SC     From the list, choose the 3  activities you would most like to be able to do without any difficulty Standing;Reaching;Bending/stooping  -SC     Total Score Optimal - 3 3  -SC               User Key  (r) = Recorded By, (t) = Taken By, (c) = Cosigned By      Initials Name Provider Type    Taiwo Steele, PT Physical Therapist    Génesis Erickson, RN Registered Nurse    Fátima Chew OT Occupational Therapist                    Occupational Therapy Education       Title: PT OT SLP Therapies (Done)       Topic: Occupational Therapy (Done)       Point: ADL training (Done)       Description:   Instruct learner(s) on proper safety adaptation and remediation techniques during self care or transfers.   Instruct in proper use of assistive devices.                  Learning Progress Summary            Patient Acceptance, E, VU by SC at 12/30/2024 1252                      Point: Home exercise program (Done)       Description:   Instruct learner(s) on appropriate technique for monitoring, assisting and/or progressing therapeutic exercises/activities.                  Learning Progress Summary            Patient Acceptance, E, VU by SC at 12/30/2024 1252                      Point: Precautions (Done)       Description:   Instruct learner(s) on prescribed precautions during self-care and functional transfers.                  Learning Progress Summary            Patient Acceptance, E, VU by SC at 12/30/2024 1252                      Point: Body mechanics (Done)       Description:   Instruct learner(s) on proper positioning and spine alignment during self-care, functional mobility activities and/or exercises.                  Learning Progress Summary            Patient Acceptance, E, VU by SC at 12/30/2024 1252                                      User Key       Initials Effective Dates Name Provider Type [facility] 02/05/24 -  Fátima Ballesteros OT Occupational Therapist OT                  OT Recommendation and Plan  Therapy Frequency (OT):  evaluation only  Plan of Care Review  Progress: no change (Evaluation)  Outcome Evaluation: Patient does not demonstrate a decline in ADL/IADL function/ mobility and will not benefit from skilled services at this time.Patient is safe to discharge to previous setting when medically appropriate. Plan to discharge OT. Please feel free to re-consult if new needs arise.     Time Calculation:   Evaluation Complexity (OT)  Review Occupational Profile/Medical/Therapy History Complexity: expanded/moderate complexity  Assessment, Occupational Performance/Identification of Deficit Complexity: 1-3 performance deficits  Clinical Decision Making Complexity (OT): problem focused assessment/low complexity  Overall Complexity of Evaluation (OT): low complexity     Time Calculation- OT       Row Name 12/30/24 1253             Untimed Charges    OT Eval/Re-eval Minutes 28  -SC         Total Minutes    Untimed Charges Total Minutes 28  -SC       Total Minutes 28  -SC                User Key  (r) = Recorded By, (t) = Taken By, (c) = Cosigned By      Initials Name Provider Type    SC Fátima Ballesteros OT Occupational Therapist                  Therapy Charges for Today       Code Description Service Date Service Provider Modifiers Qty    [id number]  OT EVAL MOD COMPLEXITY 2 12/30/2024 Fátima Ballesteros OT GO 1                 Fátima Ballesteros OT  12/30/2024

## 2024-12-31 ENCOUNTER — TRANSITIONAL CARE MANAGEMENT TELEPHONE ENCOUNTER (OUTPATIENT)
Dept: CALL CENTER | Facility: HOSPITAL | Age: 44
End: 2024-12-31
Payer: MEDICAID

## 2024-12-31 LAB
AV MEAN PRESS GRAD SYS DOP V1V2: 7 MMHG
AV VMAX SYS DOP: 172 CM/SEC
BH CV ECHO MEAS - AO MAX PG: 11.8 MMHG
BH CV ECHO MEAS - AO ROOT DIAM: 3.6 CM
BH CV ECHO MEAS - AO V2 VTI: 34.7 CM
BH CV ECHO MEAS - AVA(I,D): 2.16 CM2
BH CV ECHO MEAS - EDV(CUBED): 132.7 ML
BH CV ECHO MEAS - EDV(MOD-SP2): 83.6 ML
BH CV ECHO MEAS - EDV(MOD-SP4): 60.6 ML
BH CV ECHO MEAS - EF(MOD-SP2): 70.2 %
BH CV ECHO MEAS - EF(MOD-SP4): 66 %
BH CV ECHO MEAS - ESV(CUBED): 24.4 ML
BH CV ECHO MEAS - ESV(MOD-SP2): 24.9 ML
BH CV ECHO MEAS - ESV(MOD-SP4): 20.6 ML
BH CV ECHO MEAS - FS: 43.1 %
BH CV ECHO MEAS - IVS/LVPW: 0.9 CM
BH CV ECHO MEAS - IVSD: 0.9 CM
BH CV ECHO MEAS - LA DIMENSION: 4.1 CM
BH CV ECHO MEAS - LAT PEAK E' VEL: 13.3 CM/SEC
BH CV ECHO MEAS - LV DIASTOLIC VOL/BSA (35-75): 30.3 CM2
BH CV ECHO MEAS - LV MASS(C)D: 175.6 GRAMS
BH CV ECHO MEAS - LV MAX PG: 5.2 MMHG
BH CV ECHO MEAS - LV MEAN PG: 2 MMHG
BH CV ECHO MEAS - LV SYSTOLIC VOL/BSA (12-30): 10.3 CM2
BH CV ECHO MEAS - LV V1 MAX: 114 CM/SEC
BH CV ECHO MEAS - LV V1 VTI: 23.9 CM
BH CV ECHO MEAS - LVIDD: 5.1 CM
BH CV ECHO MEAS - LVIDS: 2.9 CM
BH CV ECHO MEAS - LVOT AREA: 3.1 CM2
BH CV ECHO MEAS - LVOT DIAM: 2 CM
BH CV ECHO MEAS - LVPWD: 1 CM
BH CV ECHO MEAS - MED PEAK E' VEL: 7.6 CM/SEC
BH CV ECHO MEAS - MV A MAX VEL: 89.6 CM/SEC
BH CV ECHO MEAS - MV DEC SLOPE: 578 CM/SEC2
BH CV ECHO MEAS - MV E MAX VEL: 98 CM/SEC
BH CV ECHO MEAS - MV E/A: 1.09
BH CV ECHO MEAS - MV MEAN PG: 2 MMHG
BH CV ECHO MEAS - MV P1/2T: 49.1 MSEC
BH CV ECHO MEAS - MV V2 VTI: 37.5 CM
BH CV ECHO MEAS - MVA(P1/2T): 4.5 CM2
BH CV ECHO MEAS - MVA(VTI): 2 CM2
BH CV ECHO MEAS - RVDD: 2.5 CM
BH CV ECHO MEAS - SV(LVOT): 75.1 ML
BH CV ECHO MEAS - SV(MOD-SP2): 58.7 ML
BH CV ECHO MEAS - SV(MOD-SP4): 40 ML
BH CV ECHO MEAS - SVI(LVOT): 37.5 ML/M2
BH CV ECHO MEAS - SVI(MOD-SP2): 29.3 ML/M2
BH CV ECHO MEAS - SVI(MOD-SP4): 20 ML/M2
BH CV ECHO MEAS - TAPSE (>1.6): 1.74 CM
BH CV ECHO MEASUREMENTS AVERAGE E/E' RATIO: 9.38
LEFT ATRIUM VOLUME INDEX: 21.9 ML/M2
LV EF BIPLANE MOD: 68.6 %

## 2024-12-31 NOTE — OUTREACH NOTE
Call Center TCM Note      Flowsheet Row Responses   [facility] facility patient discharged from? [facility]   Does the patient have one of the following disease processes/diagnoses(primary or secondary)? Stroke   TCM attempt successful? No   Unsuccessful attempts Attempt 2            Mindy Bowles RN    12/31/2024, 15:23 EST

## 2024-12-31 NOTE — OUTREACH NOTE
Call Center TCM Note      Flowsheet Row Responses   [facility] facility patient discharged from? [facility]   Does the patient have one of the following disease processes/diagnoses(primary or secondary)? Stroke   TCM attempt successful? No   Unsuccessful attempts Attempt 1            Mindy Bowles RN    12/31/2024, 12:55 EST

## 2025-01-02 ENCOUNTER — TRANSITIONAL CARE MANAGEMENT TELEPHONE ENCOUNTER (OUTPATIENT)
Dept: CALL CENTER | Facility: HOSPITAL | Age: 45
End: 2025-01-02
Payer: MEDICAID

## 2025-01-02 NOTE — OUTREACH NOTE
Call Center TCM Note      Flowsheet Row Responses   [facility] facility patient discharged from? [facility]   Does the patient have one of the following disease processes/diagnoses(primary or secondary)? Stroke   TCM attempt successful? No   Unsuccessful attempts Attempt 3            Vianey Montes De Oca RN    1/2/2025, 12:57 EST

## 2025-01-08 PROBLEM — F10.90 ALCOHOL USE: Status: ACTIVE | Noted: 2025-01-08

## 2025-01-08 PROBLEM — Z78.9 ALCOHOL USE: Status: ACTIVE | Noted: 2025-01-08

## 2025-01-22 LAB
QT INTERVAL: 411 MS
QTC INTERVAL: 444 MS

## 2025-02-23 RX ORDER — LISINOPRIL AND HYDROCHLOROTHIAZIDE 12.5; 2 MG/1; MG/1
2 TABLET ORAL DAILY
Qty: 180 TABLET | Refills: 1 | Status: SHIPPED | OUTPATIENT
Start: 2025-02-23

## 2025-04-11 DIAGNOSIS — E11.65 TYPE 2 DIABETES MELLITUS WITH HYPERGLYCEMIA, WITHOUT LONG-TERM CURRENT USE OF INSULIN: ICD-10-CM

## 2025-04-11 DIAGNOSIS — I10 PRIMARY HYPERTENSION: ICD-10-CM

## 2025-04-11 RX ORDER — AMLODIPINE BESYLATE 10 MG/1
10 TABLET ORAL DAILY
Qty: 90 TABLET | Refills: 1 | Status: SHIPPED | OUTPATIENT
Start: 2025-04-11

## 2025-04-11 RX ORDER — METFORMIN HYDROCHLORIDE 500 MG/1
TABLET, EXTENDED RELEASE ORAL
Qty: 360 TABLET | Refills: 1 | Status: SHIPPED | OUTPATIENT
Start: 2025-04-11

## 2025-05-25 DIAGNOSIS — E78.00 PURE HYPERCHOLESTEROLEMIA: ICD-10-CM

## 2025-05-27 RX ORDER — ATORVASTATIN CALCIUM 40 MG/1
40 TABLET, FILM COATED ORAL DAILY
Qty: 90 TABLET | Refills: 1 | Status: SHIPPED | OUTPATIENT
Start: 2025-05-27

## 2025-08-28 DIAGNOSIS — I10 PRIMARY HYPERTENSION: Primary | ICD-10-CM

## 2025-08-28 RX ORDER — LISINOPRIL AND HYDROCHLOROTHIAZIDE 12.5; 2 MG/1; MG/1
2 TABLET ORAL DAILY
Qty: 180 TABLET | Refills: 0 | Status: SHIPPED | OUTPATIENT
Start: 2025-08-28